# Patient Record
Sex: MALE | Race: WHITE | NOT HISPANIC OR LATINO | Employment: FULL TIME | URBAN - METROPOLITAN AREA
[De-identification: names, ages, dates, MRNs, and addresses within clinical notes are randomized per-mention and may not be internally consistent; named-entity substitution may affect disease eponyms.]

---

## 2022-03-03 ENCOUNTER — PATIENT MESSAGE (OUTPATIENT)
Dept: ENDOCRINOLOGY | Facility: CLINIC | Age: 27
End: 2022-03-03
Payer: COMMERCIAL

## 2022-03-17 ENCOUNTER — OFFICE VISIT (OUTPATIENT)
Dept: ENDOCRINOLOGY | Facility: CLINIC | Age: 27
End: 2022-03-17
Payer: COMMERCIAL

## 2022-03-17 ENCOUNTER — TELEPHONE (OUTPATIENT)
Dept: SPEECH THERAPY | Facility: HOSPITAL | Age: 27
End: 2022-03-17
Payer: COMMERCIAL

## 2022-03-17 PROCEDURE — 99204 OFFICE O/P NEW MOD 45 MIN: CPT | Mod: 95,,, | Performed by: INTERNAL MEDICINE

## 2022-03-17 PROCEDURE — 1159F MED LIST DOCD IN RCRD: CPT | Mod: CPTII,95,, | Performed by: INTERNAL MEDICINE

## 2022-03-17 PROCEDURE — 1160F PR REVIEW ALL MEDS BY PRESCRIBER/CLIN PHARMACIST DOCUMENTED: ICD-10-PCS | Mod: CPTII,95,, | Performed by: INTERNAL MEDICINE

## 2022-03-17 PROCEDURE — 1160F RVW MEDS BY RX/DR IN RCRD: CPT | Mod: CPTII,95,, | Performed by: INTERNAL MEDICINE

## 2022-03-17 PROCEDURE — 1159F PR MEDICATION LIST DOCUMENTED IN MEDICAL RECORD: ICD-10-PCS | Mod: CPTII,95,, | Performed by: INTERNAL MEDICINE

## 2022-03-17 PROCEDURE — 99204 PR OFFICE/OUTPT VISIT, NEW, LEVL IV, 45-59 MIN: ICD-10-PCS | Mod: 95,,, | Performed by: INTERNAL MEDICINE

## 2022-03-17 RX ORDER — FINASTERIDE 5 MG/1
5 TABLET, FILM COATED ORAL DAILY
Qty: 90 TABLET | Refills: 3 | Status: SHIPPED | OUTPATIENT
Start: 2022-03-17 | End: 2023-03-06

## 2022-03-17 RX ORDER — SPIRONOLACTONE 25 MG/1
25 TABLET ORAL 2 TIMES DAILY
Qty: 60 TABLET | Refills: 5 | Status: SHIPPED | OUTPATIENT
Start: 2022-03-17 | End: 2022-08-17

## 2022-03-17 RX ORDER — ESTRADIOL 1 MG/1
1 TABLET ORAL 2 TIMES DAILY
Qty: 60 TABLET | Refills: 5 | Status: SHIPPED | OUTPATIENT
Start: 2022-03-17 | End: 2022-05-10

## 2022-03-17 NOTE — ASSESSMENT & PLAN NOTE
Transwoman: gender incongruence   Reviewed therapy, side effects (both wanted and unwanted), possible adverse outcomes, expectations, compliance. Reviewed limited data on fertility, expect decline over time... consider sperm banking.     Patient encouraged to continue working with her therapist during the transition process.      Will start  Estradiol 1 mg po bid  Will start  aldactone 25 mg bid  Will start   finasteride 5 mg qd for scalp hair loss prevention     baseline labs   RTO in 3 months with labs .  Healthy lifestyle stressed  Discussed increased risk of dvt   Avoid prolonged immobility  D/c ert prior to any procedures

## 2022-03-17 NOTE — PROGRESS NOTES
Subjective:      Patient ID: Daniel Delgado is a 26 y.o. male.    Chief Complaint:  Gender     History of Present Illness  With regards to her gender incongruence care:    Gender identity - female  Pronouns she/her      Therapist at the time hormones were started:  Travon Oneil  (see medica tab)        Gender Surgeries - none, but interested grs and ffs      Fertility concerns - not interested    Has not started cross hormone therapy but would like to start.  Is also interested in voice training      Social:  Work -  Game development   Family -   Aware   generally supportive   Relationship- single   Interested in women     Housing - lives alone       was dx with social anxiety in the past       New concerns today:     none     There is a family history of thyroid disease so she would like that checked.  Denies any weight changes or bowel changes    ROS:   As above    Objective:     There were no vitals taken for this visit.  BP Readings from Last 3 Encounters:   No data found for BP     Wt Readings from Last 1 Encounters:   No data found for Wt     There is no height or weight on file to calculate BMI.      Physical Exam  Constitutional:       Appearance: Normal appearance.   Psychiatric:         Mood and Affect: Mood normal.         Behavior: Behavior normal.         Thought Content: Thought content normal.         Judgment: Judgment normal.                Lab Review: none     Assessment and Plan     trans woman (she/her)   Transwoman: gender incongruence   Reviewed therapy, side effects (both wanted and unwanted), possible adverse outcomes, expectations, compliance. Reviewed limited data on fertility, expect decline over time... consider sperm banking.     Patient encouraged to continue working with her therapist during the transition process.      Will start  Estradiol 1 mg po bid  Will start  aldactone 25 mg bid  Will start   finasteride 5 mg qd for scalp hair loss prevention     baseline labs   RTO in 3 months  with labs .  Healthy lifestyle stressed  Discussed increased risk of dvt   Avoid prolonged immobility  D/c ert prior to any procedures            The patient location is: home  The chief complaint leading to consultation is: gender    Visit type: audiovisual    Face to Face time with patient: 25 minutes of total time spent on the encounter, which includes face to face time and non-face to face time preparing to see the patient (eg, review of tests), Obtaining and/or reviewing separately obtained history, Documenting clinical information in the electronic or other health record, Independently interpreting results (not separately reported) and communicating results to the patient/family/caregiver, or Care coordination (not separately reported).         Each patient to whom he or she provides medical services by telemedicine is:  (1) informed of the relationship between the physician and patient and the respective role of any other health care provider with respect to management of the patient; and (2) notified that he or she may decline to receive medical services by telemedicine and may withdraw from such care at any time.

## 2022-03-17 NOTE — PATIENT INSTRUCTIONS
Thank you for completing a virtual visit with me!     Per our conversation:  We will start estrogen 1 mg twice a day, spironolactone 25 mg twice a day and finasteride 5 mg once a day.  My office will reach out to arrange for baseline labs soon.  I will put in a referral to the speech team and they will contact you to set up the visit.    We will plan a telemedicine or an in-clinic visit in 3 months with labs prior to that appointment.    My staff will contact you to schedule the above.     Please let me know if you have any other questions.    Thank you,  Elodia Molina MD

## 2022-03-24 ENCOUNTER — CLINICAL SUPPORT (OUTPATIENT)
Dept: SPEECH THERAPY | Facility: HOSPITAL | Age: 27
End: 2022-03-24
Attending: INTERNAL MEDICINE
Payer: COMMERCIAL

## 2022-03-24 DIAGNOSIS — R49.8 OTHER VOICE AND RESONANCE DISORDERS: Primary | ICD-10-CM

## 2022-03-24 PROCEDURE — 92524 BEHAVRAL QUALIT ANALYS VOICE: CPT | Mod: 95,GN | Performed by: SPEECH-LANGUAGE PATHOLOGIST

## 2022-03-24 NOTE — PROGRESS NOTES
Referring provider: Dr. Elodia Molina  Reason for visit:  Behavioral and qualitative analysis of voice and resonance (CPT 61088)    The patient location is: [place]  The chief complaint leading to consultation is: voice  Visit type: audiovisual  Face to Face time with patient: 40  55 minutes of total time spent on the encounter, which includes face to face time and non-face to face time preparing to see the patient (eg, review of tests), Obtaining and/or reviewing separately obtained history, Documenting clinical information in the electronic or other health record, Independently interpreting results (not separately reported) and communicating results to the patient/family/caregiver, or Care coordination (not separately reported).   Each patient to whom he or she provides medical services by telemedicine is:  (1) informed of the relationship between the physician and patient and the respective role of any other health care provider with respect to management of the patient; and (2) notified that he or she may decline to receive medical services by telemedicine and may withdraw from such care at any time.    Subjective / History    Janes Delgado is a 26 y.o. trans woman (assigned male at birth) referred for voice evaluation (CPT 06449) by Dr. Molina.  She presents with complaints of voice incongruence which began about 5 months ago when she initially identified she is a trans woman.  Denies vocal fatigue, effort, hoarseness.  Recently trying to feminize voice, does feel speaking pitch has increased a bit.  Is able to shift voice/resonance but hard time getting it to sound feminine.  Concern for being misgendered over the phone.  Patient works outside the home.  Presenting as male in most situations.  Mostly this is because she feels like she looks like a male presenting as a woman.  Only out to a select few people.  Works in game development.  Overall goal is to sound more feminine.     Gender: woman  Hormones:  just started a few days ago  Swallowing: no c/o   Breathing: no c/o    Smokin packs/day   Caffeine: minimal  Reflux: no  Water: plenty    No past medical history on file.  Current Outpatient Medications on File Prior to Visit   Medication Sig Dispense Refill    estradioL (ESTRACE) 1 MG tablet Take 1 tablet (1 mg total) by mouth 2 (two) times daily. 60 tablet 5    finasteride (PROSCAR) 5 mg tablet Take 1 tablet (5 mg total) by mouth once daily. 90 tablet 3    spironolactone (ALDACTONE) 25 MG tablet Take 1 tablet (25 mg total) by mouth 2 (two) times daily. 60 tablet 5     No current facility-administered medications on file prior to visit.       Objective    Perceptual/behavioral assessment  -CAPE-V Overall Score: 2  -Quality: clear, appropriate  -Volume: appropriate for age and gender  -Pitch: low F0  -Flexibility: diminished  -Habitual respiratory pattern: chest/clavicular    Education / Stimulability Trials  Deferred vocal hygiene discussion.  Patient was stimulable for improved voice using SOVT exercises.  Instructed pt on SOVT cup bubble phonation and she was quickly stimulable to an increased resonant placement/pitch which she reported did not feel effortful.  Instructed practicing voice for several days or weeks to determine if it feels authentic to her and if it feels comfortable and sustainable.  Discussed difficulty in changing/maintaining improved voice if still presenting as former gender in many situations, but that vocal exploration can help her find a voice she is comfortable with in the meantime.  She was amenable to all suggestions.     Functional goals  Length Status Goal   Long term Initiated  Patient and clinician will facilitate changes in vocal function in order to restore functional use of voice for daily occupational, social, and emotional demands.    Long term Initiated  Patient will re-establish phonation with adequate balance of airflow and resonance with decreased muscle tension.     Long term Initiated   Patient will be satisfied with quality of voice as it pertains to gender identity.    Short term Initiated  Patient will complete SOVT exercises and/or resonant-focused exercises 3-5x daily to strengthen and balance the intrinsic laryngeal musculature and maximize glottic closure without medial hyperfunction.   Short term Initiated  Patient will be correctly gendered over the phone with 80% accuracy.   Short term Initiated  Patient will discriminate between easy and strained phonation with 80% accuracy.    Short term Initiated   Patient will identify the sensations associated with muscle relaxation in the abdominal, thoracic, neck and facial areas during efficient phonation with minimal clinician cue.        Assessment     Patient presents with gender to voice mismatch in the setting of gender transition per Dr. Molina.  Prognosis for continued improvement is good.     Recommendations / POC    -Recommend 2-4 sessions of voice therapy over 4-6 weeks with a speech-language pathologist to optimize glottal postures for improved vocal function, vocal efficiency, and ease of phonation  -Continue exercises as discussed in session  -Contact clinician with any further questions

## 2022-04-07 ENCOUNTER — CLINICAL SUPPORT (OUTPATIENT)
Dept: SPEECH THERAPY | Facility: HOSPITAL | Age: 27
End: 2022-04-07
Attending: INTERNAL MEDICINE
Payer: COMMERCIAL

## 2022-04-07 DIAGNOSIS — R49.8 OTHER VOICE AND RESONANCE DISORDERS: ICD-10-CM

## 2022-04-07 PROCEDURE — 92507 TX SP LANG VOICE COMM INDIV: CPT | Mod: 95,GN | Performed by: SPEECH-LANGUAGE PATHOLOGIST

## 2022-04-07 NOTE — PROGRESS NOTES
"Referring provider: Dr. Elodia Molina  Reason for visit:  Voice treatment (CPT 28530)  Session #1    The patient location is: [place]  The chief complaint leading to consultation is: voice   Visit type: audiovisual  Face to Face time with patient: 35  45 minutes of total time spent on the encounter, which includes face to face time and non-face to face time preparing to see the patient (eg, review of tests), Obtaining and/or reviewing separately obtained history, Documenting clinical information in the electronic or other health record, Independently interpreting results (not separately reported) and communicating results to the patient/family/caregiver, or Care coordination (not separately reported).   Each patient to whom he or she provides medical services by telemedicine is:  (1) informed of the relationship between the physician and patient and the respective role of any other health care provider with respect to management of the patient; and (2) notified that he or she may decline to receive medical services by telemedicine and may withdraw from such care at any time.      History / Subjective   I had the pleasure of seeing Daniel Delgado for her first treatment session following complete voice evaluation on 3/24/22.  During that time, improvements were noted on SOVT voice exercises.  Since evaluation, she has been practicing regularly but is still not out in most aspects of life. At first she wasn't noticing a change but does think that after comparing it to baseline there has been a change.  Does the exercises a few times a day when she thinks about it.  She does still have to think about changing her voice but also needs to access a more "baseline" voice most times.      Objective   The primary goal of todays session was to review HEP.  This was targeted using SOVT treatment modalities.    Perceptual/behavioral assessment  -CAPE-V Overall Score: 2  -Quality: mild intermittent alejandre  -Volume: " appropriate for age and gender identity  -Pitch: low F0  -Flexibility: appropriate for age and gender identity  -Habitual respiratory pattern: chest/clavicular    Treatment  Reviewed cup bubble phonation to access improved pitch.  She is pleased with the increase but thinks she wants it to be a bit higher.  Also instructed on straw phonation into water and resonant humming.  She also felt this gave her access to a smoother, higher voice.  For home practice, clinician reviewed home exercises as practiced during the session with the patient.     Functional goals  Length Status Goal   Long term Ongoing Patient and clinician will facilitate changes in vocal function in order to restore functional use of voice for daily occupational, social, and emotional demands.    Long term Ongoing Patient will re-establish phonation with adequate balance of airflow and resonance with decreased muscle tension.    Long term Ongoing Patient will be satisfied with quality of voice as it pertains to gender identity.    Short term Ongoing Patient will complete SOVT exercises and/or resonant-focused exercises 3-5x daily to strengthen and balance the intrinsic laryngeal musculature and maximize glottic closure without medial hyperfunction.   Short term Ongoing Patient will be correctly gendered over the phone with 80% accuracy.   Short term Ongoing Patient will discriminate between easy and strained phonation with 80% accuracy.    Short term Ongoing Patient will identify the sensations associated with muscle relaxation in the abdominal, thoracic, neck and facial areas during efficient phonation with minimal clinician cue.        Assessment     Patient presents with gender to voice mismatch in the setting of gender transition per Dr. Molina.  Prognosis for continued improvement is good.     Recommendations / POC    -Recommend 2-4 sessions of voice therapy over 4-6 weeks with a speech-language pathologist to optimize glottal postures for  improved vocal function, vocal efficiency, and ease of phonation  -Continue exercises as discussed in session  -Contact clinician with any further questions

## 2022-04-10 ENCOUNTER — PATIENT MESSAGE (OUTPATIENT)
Dept: ENDOCRINOLOGY | Facility: CLINIC | Age: 27
End: 2022-04-10
Payer: COMMERCIAL

## 2022-04-28 ENCOUNTER — CLINICAL SUPPORT (OUTPATIENT)
Dept: SPEECH THERAPY | Facility: HOSPITAL | Age: 27
End: 2022-04-28
Payer: COMMERCIAL

## 2022-04-28 DIAGNOSIS — R49.8 OTHER VOICE AND RESONANCE DISORDERS: ICD-10-CM

## 2022-04-28 PROCEDURE — 92507 TX SP LANG VOICE COMM INDIV: CPT | Mod: 95,GN | Performed by: SPEECH-LANGUAGE PATHOLOGIST

## 2022-04-28 NOTE — PROGRESS NOTES
Referring provider: Dr. Elodia Molina  Reason for visit:  Voice treatment (CPT 24965)  Session #2    The patient location is: [place]  The chief complaint leading to consultation is: voice   Visit type: audiovisual  Face to Face time with patient: 32  40 minutes of total time spent on the encounter, which includes face to face time and non-face to face time preparing to see the patient (eg, review of tests), Obtaining and/or reviewing separately obtained history, Documenting clinical information in the electronic or other health record, Independently interpreting results (not separately reported) and communicating results to the patient/family/caregiver, or Care coordination (not separately reported).   Each patient to whom he or she provides medical services by telemedicine is:  (1) informed of the relationship between the physician and patient and the respective role of any other health care provider with respect to management of the patient; and (2) notified that he or she may decline to receive medical services by telemedicine and may withdraw from such care at any time.      History / Subjective   I had the pleasure of seeing Daniel Delgado for her first treatment session following complete voice evaluation on 3/24/22.  During that time, improvements were noted on SOVT voice exercises.  Since last session on 4/7/22, she has been practicing her voice and feels she has made some progress.  Feels like now she can more easily access a better voice - feels it sounds more natural.  Uses it only when she talks to herself.  Still not using it publicly.      Objective   The primary goal of todays session was to review HEP.  This was targeted using SOVT treatment modalities.    Perceptual/behavioral assessment  -CAPE-V Overall Score: 2  -Quality: mild intermittent alejandre  -Volume: appropriate for age and gender identity  -Pitch: low F0  -Flexibility: appropriate for age and gender identity  -Habitual respiratory  pattern: chest/clavicular    Treatment  Reviewed straw bubble phonation to access pitch.  She notes a break when gliding on bubbles that she does not notice when using /I/ sound.  discussed balancing breath support with VF closure to improve glide w/o break.  Encouraged her to practice voice she feels most comfortable with - she demonstrates practicing at a much higher pitch than baseline, which she reports is comfortable, but thinks she would ultimately like her voice to land somewhere slightly more androgenous.  For home practice, clinician reviewed home exercises as practiced during the session with the patient.     Functional goals  Length Status Goal   Long term Ongoing Patient and clinician will facilitate changes in vocal function in order to restore functional use of voice for daily occupational, social, and emotional demands.    Long term Ongoing Patient will re-establish phonation with adequate balance of airflow and resonance with decreased muscle tension.    Long term Ongoing Patient will be satisfied with quality of voice as it pertains to gender identity.    Short term Ongoing Patient will complete SOVT exercises and/or resonant-focused exercises 3-5x daily to strengthen and balance the intrinsic laryngeal musculature and maximize glottic closure without medial hyperfunction.   Short term Ongoing Patient will be correctly gendered over the phone with 80% accuracy.   Short term Ongoing Patient will discriminate between easy and strained phonation with 80% accuracy.    Short term Ongoing Patient will identify the sensations associated with muscle relaxation in the abdominal, thoracic, neck and facial areas during efficient phonation with minimal clinician cue.        Assessment     Patient presents with gender to voice mismatch in the setting of gender transition per Dr. Molina.  Prognosis for continued improvement is good.     Recommendations / POC    -Recommend 2-4 sessions of voice therapy over 4-6  weeks with a speech-language pathologist to optimize glottal postures for improved vocal function, vocal efficiency, and ease of phonation  -Continue exercises as discussed in session  -Contact clinician with any further questions

## 2022-06-13 ENCOUNTER — PATIENT MESSAGE (OUTPATIENT)
Dept: SPEECH THERAPY | Facility: HOSPITAL | Age: 27
End: 2022-06-13
Payer: COMMERCIAL

## 2022-08-01 ENCOUNTER — OFFICE VISIT (OUTPATIENT)
Dept: ENDOCRINOLOGY | Facility: CLINIC | Age: 27
End: 2022-08-01
Payer: COMMERCIAL

## 2022-08-01 PROCEDURE — 99213 OFFICE O/P EST LOW 20 MIN: CPT | Mod: 95,,, | Performed by: INTERNAL MEDICINE

## 2022-08-01 PROCEDURE — 1159F PR MEDICATION LIST DOCUMENTED IN MEDICAL RECORD: ICD-10-PCS | Mod: CPTII,95,, | Performed by: INTERNAL MEDICINE

## 2022-08-01 PROCEDURE — 1160F RVW MEDS BY RX/DR IN RCRD: CPT | Mod: CPTII,95,, | Performed by: INTERNAL MEDICINE

## 2022-08-01 PROCEDURE — 1160F PR REVIEW ALL MEDS BY PRESCRIBER/CLIN PHARMACIST DOCUMENTED: ICD-10-PCS | Mod: CPTII,95,, | Performed by: INTERNAL MEDICINE

## 2022-08-01 PROCEDURE — 99213 PR OFFICE/OUTPT VISIT, EST, LEVL III, 20-29 MIN: ICD-10-PCS | Mod: 95,,, | Performed by: INTERNAL MEDICINE

## 2022-08-01 PROCEDURE — 1159F MED LIST DOCD IN RCRD: CPT | Mod: CPTII,95,, | Performed by: INTERNAL MEDICINE

## 2022-08-01 NOTE — PATIENT INSTRUCTIONS
Thank you for completing a virtual visit with me!     Per our conversation:  My nurse will reach out to arrange for labs to be done soon.  I am expecting that we will be able to double the doses of estrogen and spironolactone at that time.  I will send you a note in patient portal once I see the results    We will plan a telemedicine or an in-clinic visit in 3  months with labs prior to that appointment.    My staff will contact you to schedule the above.     Please let me know if you have any other questions.    Thank you,  Elodia Molina MD

## 2022-08-01 NOTE — PROGRESS NOTES
Subjective:      Patient ID: Daniel Delgado is a 27 y.o. male.    Chief Complaint:  Gender     History of Present Illness  With regards to her gender incongruence care:    Gender identity - female  Pronouns she/her      Therapist at the time hormones were started:  Travon Oneil  (see medica tab)        Gender Surgeries - none, but interested grs and ffs      Fertility concerns - not interested      started cross hormone therapy 3/2022  Estradiol 1 mg po bid  aldactone 25 mg bid  finasteride 5 mg qd for scalp hair loss prevention   Seeing positive changes   No regret   Is excited she is doing this   Did  voice training      Social:  Work -  Game development   Family -   Aware   generally supportive   Relationship- single   Interested in women     Housing - lives alone       was dx with social anxiety in the past       New concerns today:     none     There is a family history of thyroid disease so she would like that checked.  Denies any weight changes or bowel changes    ROS:   As above    Objective:     There were no vitals taken for this visit.  BP Readings from Last 3 Encounters:   No data found for BP     Wt Readings from Last 1 Encounters:   No data found for Wt     There is no height or weight on file to calculate BMI.      Physical Exam  Constitutional:       Appearance: Normal appearance.   Psychiatric:         Mood and Affect: Mood normal.         Behavior: Behavior normal.         Thought Content: Thought content normal.         Judgment: Judgment normal.                Lab Review: none     Assessment and Plan     trans woman (she/her)   Transwoman: gender incongruence   Reviewed therapy, side effects (both wanted and unwanted), possible adverse outcomes, expectations, compliance. Reviewed limited data on fertility, expect decline over time... consider sperm banking.     Patient encouraged to continue working with her therapist during the transition process.      Labs ASAP.  Expect will be able to double the  doses of estrogen and spironolactone at that     RTO in 3 months with labs .  Healthy lifestyle stressed  Discussed increased risk of dvt   Avoid prolonged immobility  D/c ert prior to any procedures            The patient location is: home  The chief complaint leading to consultation is: gender    Visit type: audiovisual    Face to Face time with patient: 25 minutes of total time spent on the encounter, which includes face to face time and non-face to face time preparing to see the patient (eg, review of tests), Obtaining and/or reviewing separately obtained history, Documenting clinical information in the electronic or other health record, Independently interpreting results (not separately reported) and communicating results to the patient/family/caregiver, or Care coordination (not separately reported).         Each patient to whom he or she provides medical services by telemedicine is:  (1) informed of the relationship between the physician and patient and the respective role of any other health care provider with respect to management of the patient; and (2) notified that he or she may decline to receive medical services by telemedicine and may withdraw from such care at any time.

## 2022-08-01 NOTE — ASSESSMENT & PLAN NOTE
Transwoman: gender incongruence   Reviewed therapy, side effects (both wanted and unwanted), possible adverse outcomes, expectations, compliance. Reviewed limited data on fertility, expect decline over time... consider sperm banking.     Patient encouraged to continue working with her therapist during the transition process.      Labs ASAP.  Expect will be able to double the doses of estrogen and spironolactone at that     RTO in 3 months with labs .  Healthy lifestyle stressed  Discussed increased risk of dvt   Avoid prolonged immobility  D/c ert prior to any procedures

## 2022-08-02 ENCOUNTER — PATIENT MESSAGE (OUTPATIENT)
Dept: ENDOCRINOLOGY | Facility: CLINIC | Age: 27
End: 2022-08-02
Payer: COMMERCIAL

## 2022-09-02 ENCOUNTER — LAB VISIT (OUTPATIENT)
Dept: LAB | Facility: HOSPITAL | Age: 27
End: 2022-09-02
Attending: INTERNAL MEDICINE
Payer: COMMERCIAL

## 2022-09-02 LAB
ALBUMIN SERPL BCP-MCNC: 4.8 G/DL (ref 3.5–5.2)
ALP SERPL-CCNC: 70 U/L (ref 55–135)
ALT SERPL W/O P-5'-P-CCNC: 21 U/L (ref 10–44)
ANION GAP SERPL CALC-SCNC: 11 MMOL/L (ref 8–16)
AST SERPL-CCNC: 25 U/L (ref 10–40)
BILIRUB SERPL-MCNC: 0.9 MG/DL (ref 0.1–1)
BUN SERPL-MCNC: 9 MG/DL (ref 6–20)
CALCIUM SERPL-MCNC: 10.2 MG/DL (ref 8.7–10.5)
CHLORIDE SERPL-SCNC: 102 MMOL/L (ref 95–110)
CHOLEST SERPL-MCNC: 174 MG/DL (ref 120–199)
CHOLEST/HDLC SERPL: 3.6 {RATIO} (ref 2–5)
CO2 SERPL-SCNC: 26 MMOL/L (ref 23–29)
CREAT SERPL-MCNC: 1 MG/DL (ref 0.5–1.4)
EST. GFR  (NO RACE VARIABLE): >60 ML/MIN/1.73 M^2
ESTRADIOL SERPL-MCNC: 53 PG/ML (ref 11–44)
GLUCOSE SERPL-MCNC: 87 MG/DL (ref 70–110)
HDLC SERPL-MCNC: 49 MG/DL (ref 40–75)
HDLC SERPL: 28.2 % (ref 20–50)
LDLC SERPL CALC-MCNC: 106.6 MG/DL (ref 63–159)
NONHDLC SERPL-MCNC: 125 MG/DL
POTASSIUM SERPL-SCNC: 4.3 MMOL/L (ref 3.5–5.1)
PROT SERPL-MCNC: 7.8 G/DL (ref 6–8.4)
SODIUM SERPL-SCNC: 139 MMOL/L (ref 136–145)
TESTOST SERPL-MCNC: 211 NG/DL (ref 304–1227)
TRIGL SERPL-MCNC: 92 MG/DL (ref 30–150)
TSH SERPL DL<=0.005 MIU/L-ACNC: 3.94 UIU/ML (ref 0.4–4)

## 2022-09-02 PROCEDURE — 80053 COMPREHEN METABOLIC PANEL: CPT | Performed by: INTERNAL MEDICINE

## 2022-09-02 PROCEDURE — 84403 ASSAY OF TOTAL TESTOSTERONE: CPT | Performed by: INTERNAL MEDICINE

## 2022-09-02 PROCEDURE — 36415 COLL VENOUS BLD VENIPUNCTURE: CPT | Performed by: INTERNAL MEDICINE

## 2022-09-02 PROCEDURE — 82670 ASSAY OF TOTAL ESTRADIOL: CPT | Performed by: INTERNAL MEDICINE

## 2022-09-02 PROCEDURE — 84443 ASSAY THYROID STIM HORMONE: CPT | Performed by: INTERNAL MEDICINE

## 2022-09-02 PROCEDURE — 80061 LIPID PANEL: CPT | Performed by: INTERNAL MEDICINE

## 2022-09-06 ENCOUNTER — PATIENT MESSAGE (OUTPATIENT)
Dept: ENDOCRINOLOGY | Facility: CLINIC | Age: 27
End: 2022-09-06
Payer: COMMERCIAL

## 2022-09-06 RX ORDER — SPIRONOLACTONE 50 MG/1
50 TABLET, FILM COATED ORAL 2 TIMES DAILY
Qty: 180 TABLET | Refills: 3 | Status: SHIPPED | OUTPATIENT
Start: 2022-09-06 | End: 2023-09-01

## 2022-09-06 RX ORDER — ESTRADIOL 2 MG/1
2 TABLET ORAL 2 TIMES DAILY
Qty: 180 TABLET | Refills: 3 | Status: SHIPPED | OUTPATIENT
Start: 2022-09-06 | End: 2022-12-21 | Stop reason: SDUPTHER

## 2022-12-07 ENCOUNTER — OFFICE VISIT (OUTPATIENT)
Dept: ENDOCRINOLOGY | Facility: CLINIC | Age: 27
End: 2022-12-07
Payer: COMMERCIAL

## 2022-12-07 DIAGNOSIS — Z79.899 TRANSGENDER WOMAN ON HORMONE THERAPY: ICD-10-CM

## 2022-12-07 DIAGNOSIS — F64.0 TRANSGENDER WOMAN ON HORMONE THERAPY: ICD-10-CM

## 2022-12-07 PROCEDURE — 1160F PR REVIEW ALL MEDS BY PRESCRIBER/CLIN PHARMACIST DOCUMENTED: ICD-10-PCS | Mod: CPTII,95,, | Performed by: INTERNAL MEDICINE

## 2022-12-07 PROCEDURE — 1159F MED LIST DOCD IN RCRD: CPT | Mod: CPTII,95,, | Performed by: INTERNAL MEDICINE

## 2022-12-07 PROCEDURE — 1160F RVW MEDS BY RX/DR IN RCRD: CPT | Mod: CPTII,95,, | Performed by: INTERNAL MEDICINE

## 2022-12-07 PROCEDURE — 99213 OFFICE O/P EST LOW 20 MIN: CPT | Mod: 95,,, | Performed by: INTERNAL MEDICINE

## 2022-12-07 PROCEDURE — 99213 PR OFFICE/OUTPT VISIT, EST, LEVL III, 20-29 MIN: ICD-10-PCS | Mod: 95,,, | Performed by: INTERNAL MEDICINE

## 2022-12-07 PROCEDURE — 1159F PR MEDICATION LIST DOCUMENTED IN MEDICAL RECORD: ICD-10-PCS | Mod: CPTII,95,, | Performed by: INTERNAL MEDICINE

## 2022-12-07 NOTE — PATIENT INSTRUCTIONS
Thank you for completing a virtual visit with me!     Per our conversation:  My nurse will reach out to arrange for labs to be done soon.  I am expecting that we will be able to increase the doses of estrogen and spironolactone at that time.  I will send you a note in patient portal once I see the results      Also, reach out to your pharmacy and see if they have injectable estrogen in stock.  It is called estradiol valerate.  It comes in it 10, 20, 40 milligram/mL vial.  Any will work....  Just let me know    We will plan a telemedicine or an in-clinic visit in 3  months with labs prior to that appointment.    Please let me know if you have any other questions.    Thank you,  Elodia Molina MD

## 2022-12-07 NOTE — PROGRESS NOTES
Subjective:      Patient ID: Daniel Delgado is a 27 y.o. male.    Chief Complaint:  Gender     History of Present Illness  With regards to her gender incongruence care:    Gender identity - female  Pronouns she/her      Therapist at the time hormones were started:  Travon Oneil  (see media tab)        Gender Surgeries - none, but interested grs and ffs      Fertility concerns - not interested      started cross hormone therapy 3/2022  Estradiol 2  mg po bid  --sl  - interested in injections   aldactone 50  mg bid  finasteride 5 mg qd for scalp hair loss prevention   Seeing positive changes   +breast development   No regret   Is excited she is doing this   Did  voice training      Social:  Work -  Game development   Family -   Aware   generally supportive   Relationship- single   Interested in women     Housing - lives alone       was dx with social anxiety in the past       New concerns today:     none     There is a family history of thyroid disease so she would like that checked.  Denies any weight changes or bowel changes    ROS:   As above    Objective:     There were no vitals taken for this visit.  BP Readings from Last 3 Encounters:   No data found for BP     Wt Readings from Last 1 Encounters:   No data found for Wt     There is no height or weight on file to calculate BMI.      Physical Exam  Constitutional:       Appearance: Normal appearance.   Psychiatric:         Mood and Affect: Mood normal.         Behavior: Behavior normal.         Thought Content: Thought content normal.         Judgment: Judgment normal.              Lab Reviewed    Assessment and Plan     Transgender woman on hormone therapy  Transwoman: gender incongruence   Reviewed therapy, side effects (both wanted and unwanted), possible adverse outcomes, expectations, compliance. Reviewed limited data on fertility, expect decline over time...      Labs soon.  Expect will be able to increase the doses of estrogen and spironolactone at that  time    She will look into the availability of estradiol valerate and let me know if affordable and available.  If so we can switch     RTO in 3 months with labs .  Healthy lifestyle stressed  Discussed increased risk of dvt   Avoid prolonged immobility  D/c ert prior to any procedures        Check TPO    The patient location is: home  The chief complaint leading to consultation is: gender    Visit type: audiovisual    Face to Face time with patient: 25 minutes of total time spent on the encounter, which includes face to face time and non-face to face time preparing to see the patient (eg, review of tests), Obtaining and/or reviewing separately obtained history, Documenting clinical information in the electronic or other health record, Independently interpreting results (not separately reported) and communicating results to the patient/family/caregiver, or Care coordination (not separately reported).         Each patient to whom he or she provides medical services by telemedicine is:  (1) informed of the relationship between the physician and patient and the respective role of any other health care provider with respect to management of the patient; and (2) notified that he or she may decline to receive medical services by telemedicine and may withdraw from such care at any time.

## 2022-12-07 NOTE — ASSESSMENT & PLAN NOTE
Transwoman: gender incongruence   Reviewed therapy, side effects (both wanted and unwanted), possible adverse outcomes, expectations, compliance. Reviewed limited data on fertility, expect decline over time...      Labs soon.  Expect will be able to increase the doses of estrogen and spironolactone at that time    She will look into the availability of estradiol valerate and let me know if affordable and available.  If so we can switch     RTO in 3 months with labs .  Healthy lifestyle stressed  Discussed increased risk of dvt   Avoid prolonged immobility  D/c ert prior to any procedures

## 2022-12-14 ENCOUNTER — LAB VISIT (OUTPATIENT)
Dept: LAB | Facility: HOSPITAL | Age: 27
End: 2022-12-14
Attending: INTERNAL MEDICINE
Payer: COMMERCIAL

## 2022-12-14 DIAGNOSIS — F64.0 TRANSGENDER WOMAN ON HORMONE THERAPY: ICD-10-CM

## 2022-12-14 DIAGNOSIS — Z79.899 TRANSGENDER WOMAN ON HORMONE THERAPY: ICD-10-CM

## 2022-12-14 LAB
ALBUMIN SERPL BCP-MCNC: 4.3 G/DL (ref 3.5–5.2)
ALP SERPL-CCNC: 66 U/L (ref 55–135)
ALT SERPL W/O P-5'-P-CCNC: 16 U/L (ref 10–44)
ANION GAP SERPL CALC-SCNC: 9 MMOL/L (ref 8–16)
AST SERPL-CCNC: 18 U/L (ref 10–40)
BILIRUB SERPL-MCNC: 0.5 MG/DL (ref 0.1–1)
BUN SERPL-MCNC: 8 MG/DL (ref 6–20)
CALCIUM SERPL-MCNC: 9.8 MG/DL (ref 8.7–10.5)
CHLORIDE SERPL-SCNC: 101 MMOL/L (ref 95–110)
CO2 SERPL-SCNC: 27 MMOL/L (ref 23–29)
CREAT SERPL-MCNC: 0.9 MG/DL (ref 0.5–1.4)
EST. GFR  (NO RACE VARIABLE): >60 ML/MIN/1.73 M^2
ESTRADIOL SERPL-MCNC: 53 PG/ML (ref 11–44)
GLUCOSE SERPL-MCNC: 92 MG/DL (ref 70–110)
POTASSIUM SERPL-SCNC: 3.7 MMOL/L (ref 3.5–5.1)
PROT SERPL-MCNC: 7.5 G/DL (ref 6–8.4)
SODIUM SERPL-SCNC: 137 MMOL/L (ref 136–145)
TESTOST SERPL-MCNC: 51 NG/DL (ref 304–1227)
THYROPEROXIDASE IGG SERPL-ACNC: <6 IU/ML
TSH SERPL DL<=0.005 MIU/L-ACNC: 2.08 UIU/ML (ref 0.4–4)

## 2022-12-14 PROCEDURE — 86376 MICROSOMAL ANTIBODY EACH: CPT | Performed by: INTERNAL MEDICINE

## 2022-12-14 PROCEDURE — 82670 ASSAY OF TOTAL ESTRADIOL: CPT | Performed by: INTERNAL MEDICINE

## 2022-12-14 PROCEDURE — 80053 COMPREHEN METABOLIC PANEL: CPT | Performed by: INTERNAL MEDICINE

## 2022-12-14 PROCEDURE — 36415 COLL VENOUS BLD VENIPUNCTURE: CPT | Performed by: INTERNAL MEDICINE

## 2022-12-14 PROCEDURE — 84403 ASSAY OF TOTAL TESTOSTERONE: CPT | Performed by: INTERNAL MEDICINE

## 2022-12-14 PROCEDURE — 84443 ASSAY THYROID STIM HORMONE: CPT | Performed by: INTERNAL MEDICINE

## 2022-12-15 ENCOUNTER — PATIENT MESSAGE (OUTPATIENT)
Dept: ENDOCRINOLOGY | Facility: CLINIC | Age: 27
End: 2022-12-15
Payer: COMMERCIAL

## 2022-12-15 DIAGNOSIS — F64.0 TRANSGENDER WOMAN ON HORMONE THERAPY: Primary | ICD-10-CM

## 2022-12-15 DIAGNOSIS — Z79.899 TRANSGENDER WOMAN ON HORMONE THERAPY: Primary | ICD-10-CM

## 2022-12-21 ENCOUNTER — PATIENT MESSAGE (OUTPATIENT)
Dept: ENDOCRINOLOGY | Facility: CLINIC | Age: 27
End: 2022-12-21
Payer: COMMERCIAL

## 2022-12-21 RX ORDER — ESTRADIOL 2 MG/1
3 TABLET ORAL 2 TIMES DAILY
Qty: 270 TABLET | Refills: 3 | Status: SHIPPED | OUTPATIENT
Start: 2022-12-21 | End: 2023-03-09 | Stop reason: SDUPTHER

## 2023-01-03 ENCOUNTER — TELEPHONE (OUTPATIENT)
Dept: ENDOCRINOLOGY | Facility: CLINIC | Age: 28
End: 2023-01-03
Payer: COMMERCIAL

## 2023-01-03 ENCOUNTER — PATIENT MESSAGE (OUTPATIENT)
Dept: ENDOCRINOLOGY | Facility: CLINIC | Age: 28
End: 2023-01-03
Payer: COMMERCIAL

## 2023-01-03 NOTE — TELEPHONE ENCOUNTER
Called the pharmacy to find out whether rx was received.    Pharmacy staff stated that they filled it on 12/21/22. Patient has to use up the medication that they picked up previously due to insurance and follow-through with new directions provided on the new script.

## 2023-01-04 ENCOUNTER — TELEPHONE (OUTPATIENT)
Dept: ENDOCRINOLOGY | Facility: CLINIC | Age: 28
End: 2023-01-04
Payer: COMMERCIAL

## 2023-02-23 ENCOUNTER — PATIENT MESSAGE (OUTPATIENT)
Dept: ENDOCRINOLOGY | Facility: CLINIC | Age: 28
End: 2023-02-23
Payer: COMMERCIAL

## 2023-03-06 ENCOUNTER — LAB VISIT (OUTPATIENT)
Dept: LAB | Facility: HOSPITAL | Age: 28
End: 2023-03-06
Attending: INTERNAL MEDICINE
Payer: COMMERCIAL

## 2023-03-06 DIAGNOSIS — F64.0 TRANSGENDER WOMAN ON HORMONE THERAPY: ICD-10-CM

## 2023-03-06 DIAGNOSIS — Z79.899 TRANSGENDER WOMAN ON HORMONE THERAPY: ICD-10-CM

## 2023-03-06 PROCEDURE — 84403 ASSAY OF TOTAL TESTOSTERONE: CPT | Performed by: INTERNAL MEDICINE

## 2023-03-06 PROCEDURE — 36415 COLL VENOUS BLD VENIPUNCTURE: CPT | Performed by: INTERNAL MEDICINE

## 2023-03-06 PROCEDURE — 82670 ASSAY OF TOTAL ESTRADIOL: CPT | Performed by: INTERNAL MEDICINE

## 2023-03-07 LAB
ESTRADIOL SERPL-MCNC: 86 PG/ML (ref 11–44)
TESTOST SERPL-MCNC: 25 NG/DL (ref 304–1227)

## 2023-03-09 ENCOUNTER — TELEPHONE (OUTPATIENT)
Dept: ENDOCRINOLOGY | Facility: CLINIC | Age: 28
End: 2023-03-09
Payer: COMMERCIAL

## 2023-03-09 ENCOUNTER — OFFICE VISIT (OUTPATIENT)
Dept: ENDOCRINOLOGY | Facility: CLINIC | Age: 28
End: 2023-03-09
Attending: INTERNAL MEDICINE
Payer: COMMERCIAL

## 2023-03-09 DIAGNOSIS — F64.0 TRANSGENDER WOMAN ON HORMONE THERAPY: ICD-10-CM

## 2023-03-09 DIAGNOSIS — Z79.899 TRANSGENDER WOMAN ON HORMONE THERAPY: ICD-10-CM

## 2023-03-09 PROCEDURE — 99213 PR OFFICE/OUTPT VISIT, EST, LEVL III, 20-29 MIN: ICD-10-PCS | Mod: 95,,, | Performed by: INTERNAL MEDICINE

## 2023-03-09 PROCEDURE — 1160F RVW MEDS BY RX/DR IN RCRD: CPT | Mod: CPTII,95,, | Performed by: INTERNAL MEDICINE

## 2023-03-09 PROCEDURE — 1160F PR REVIEW ALL MEDS BY PRESCRIBER/CLIN PHARMACIST DOCUMENTED: ICD-10-PCS | Mod: CPTII,95,, | Performed by: INTERNAL MEDICINE

## 2023-03-09 PROCEDURE — 99213 OFFICE O/P EST LOW 20 MIN: CPT | Mod: 95,,, | Performed by: INTERNAL MEDICINE

## 2023-03-09 PROCEDURE — 1159F PR MEDICATION LIST DOCUMENTED IN MEDICAL RECORD: ICD-10-PCS | Mod: CPTII,95,, | Performed by: INTERNAL MEDICINE

## 2023-03-09 PROCEDURE — 1159F MED LIST DOCD IN RCRD: CPT | Mod: CPTII,95,, | Performed by: INTERNAL MEDICINE

## 2023-03-09 RX ORDER — ESTRADIOL 2 MG/1
4 TABLET ORAL 2 TIMES DAILY
Qty: 360 TABLET | Refills: 3 | Status: SHIPPED | OUTPATIENT
Start: 2023-03-09 | End: 2023-11-30

## 2023-03-09 NOTE — ASSESSMENT & PLAN NOTE
Transwoman: gender incongruence   Reviewed therapy, side effects (both wanted and unwanted), possible adverse outcomes, expectations, compliance.      Will increase Estradiol 4 mg po bid  Will continue aldactone 50 mg bid  Will continue finasteride 5 mg qd for scalp hair loss prevention     She will read about progesterone prior to next visit     Visit in 3 months with labs .  Healthy lifestyle stressed  Discussed increased risk of dvt   Avoid prolonged immobility  D/c ert prior to any procedures

## 2023-03-09 NOTE — PATIENT INSTRUCTIONS
Thank you for completing a virtual visit with me!     Per our conversation:  We will increase your estrogen to 4 mg twice a day.  Please continue the same dose of spironolactone.  Please look into progesterone and we will discuss starting it at next visit.      We will plan a telemedicine or an in-clinic visit in 3  months with labs prior to that appointment.    Please let me know if you have any other questions.    Thank you,  Elodia Molina MD

## 2023-03-09 NOTE — PROGRESS NOTES
Subjective:      Patient ID: Daniel Delgado is a 27 y.o. male.    Chief Complaint:  Gender     History of Present Illness  With regards to her gender incongruence care:    Gender identity - female  Pronouns she/her     She does not want to change her preferred name in our system because she often presents as male when she gets labs done       Therapist at the time hormones were started:  Travon Oneil  (see media tab)        Gender Surgeries - none, but interested grs and ffs      Fertility concerns - not interested      started cross hormone therapy 3/2022  Estradiol 3  mg po bid  --sl  - was interested in injections but decided to hold off   aldactone 50  mg bid  finasteride 5 mg qd for scalp hair loss prevention     Seeing positive changes     +breast development   No regret   Is excited she is doing this   Did  voice training      Social:  Work -  Game development   Family -   Aware   generally supportive   Relationship- single   Interested in women     Housing - lives alone       was dx with social anxiety in the past       New concerns today:     none        ROS:   As above    Objective:     There were no vitals taken for this visit.  BP Readings from Last 3 Encounters:   No data found for BP     Wt Readings from Last 1 Encounters:   No data found for Wt     There is no height or weight on file to calculate BMI.      Physical Exam  Constitutional:       Appearance: Normal appearance.   Psychiatric:         Mood and Affect: Mood normal.         Behavior: Behavior normal.         Thought Content: Thought content normal.         Judgment: Judgment normal.              Lab Reviewed   Latest Reference Range & Units 03/06/23 12:01   Estradiol 11 - 44 pg/mL 86 (H)   Testosterone, Total 304 - 1227 ng/dL 25 (L)   (H): Data is abnormally high  (L): Data is abnormally low  Assessment and Plan     Transgender woman on hormone therapy  Transwoman: gender incongruence   Reviewed therapy, side effects (both wanted and  unwanted), possible adverse outcomes, expectations, compliance.      Will increase Estradiol 4 mg po bid  Will continue aldactone 50 mg bid  Will continue finasteride 5 mg qd for scalp hair loss prevention     She will read about progesterone prior to next visit     Visit in 3 months with labs .  Healthy lifestyle stressed  Discussed increased risk of dvt   Avoid prolonged immobility  D/c ert prior to any procedures             The patient location is: home  The chief complaint leading to consultation is: gender    Visit type: audiovisual    Face to Face time with patient: 25 minutes of total time spent on the encounter, which includes face to face time and non-face to face time preparing to see the patient (eg, review of tests), Obtaining and/or reviewing separately obtained history, Documenting clinical information in the electronic or other health record, Independently interpreting results (not separately reported) and communicating results to the patient/family/caregiver, or Care coordination (not separately reported).         Each patient to whom he or she provides medical services by telemedicine is:  (1) informed of the relationship between the physician and patient and the respective role of any other health care provider with respect to management of the patient; and (2) notified that he or she may decline to receive medical services by telemedicine and may withdraw from such care at any time.

## 2023-06-08 ENCOUNTER — LAB VISIT (OUTPATIENT)
Dept: LAB | Facility: HOSPITAL | Age: 28
End: 2023-06-08
Attending: INTERNAL MEDICINE
Payer: COMMERCIAL

## 2023-06-08 DIAGNOSIS — Z79.899 TRANSGENDER WOMAN ON HORMONE THERAPY: ICD-10-CM

## 2023-06-08 DIAGNOSIS — F64.0 TRANSGENDER WOMAN ON HORMONE THERAPY: ICD-10-CM

## 2023-06-08 LAB
ALBUMIN SERPL BCP-MCNC: 4.2 G/DL (ref 3.5–5.2)
ALP SERPL-CCNC: 51 U/L (ref 55–135)
ALT SERPL W/O P-5'-P-CCNC: 12 U/L (ref 10–44)
ANION GAP SERPL CALC-SCNC: 9 MMOL/L (ref 8–16)
AST SERPL-CCNC: 16 U/L (ref 10–40)
BILIRUB SERPL-MCNC: 0.5 MG/DL (ref 0.1–1)
BUN SERPL-MCNC: 9 MG/DL (ref 6–20)
CALCIUM SERPL-MCNC: 9.1 MG/DL (ref 8.7–10.5)
CHLORIDE SERPL-SCNC: 102 MMOL/L (ref 95–110)
CO2 SERPL-SCNC: 26 MMOL/L (ref 23–29)
CREAT SERPL-MCNC: 1.1 MG/DL (ref 0.5–1.4)
EST. GFR  (NO RACE VARIABLE): >60 ML/MIN/1.73 M^2
ESTRADIOL SERPL-MCNC: 103 PG/ML (ref 11–44)
GLUCOSE SERPL-MCNC: 80 MG/DL (ref 70–110)
POTASSIUM SERPL-SCNC: 3.7 MMOL/L (ref 3.5–5.1)
PROT SERPL-MCNC: 7.1 G/DL (ref 6–8.4)
SODIUM SERPL-SCNC: 137 MMOL/L (ref 136–145)
TESTOST SERPL-MCNC: 28 NG/DL (ref 304–1227)

## 2023-06-08 PROCEDURE — 84403 ASSAY OF TOTAL TESTOSTERONE: CPT | Performed by: INTERNAL MEDICINE

## 2023-06-08 PROCEDURE — 82670 ASSAY OF TOTAL ESTRADIOL: CPT | Performed by: INTERNAL MEDICINE

## 2023-06-08 PROCEDURE — 80053 COMPREHEN METABOLIC PANEL: CPT | Performed by: INTERNAL MEDICINE

## 2023-06-08 PROCEDURE — 36415 COLL VENOUS BLD VENIPUNCTURE: CPT | Performed by: INTERNAL MEDICINE

## 2023-06-15 ENCOUNTER — OFFICE VISIT (OUTPATIENT)
Dept: ENDOCRINOLOGY | Facility: CLINIC | Age: 28
End: 2023-06-15
Attending: INTERNAL MEDICINE
Payer: COMMERCIAL

## 2023-06-15 DIAGNOSIS — Z79.899 TRANSGENDER WOMAN ON HORMONE THERAPY: ICD-10-CM

## 2023-06-15 DIAGNOSIS — F64.0 TRANSGENDER WOMAN ON HORMONE THERAPY: ICD-10-CM

## 2023-06-15 PROCEDURE — 99213 PR OFFICE/OUTPT VISIT, EST, LEVL III, 20-29 MIN: ICD-10-PCS | Mod: 95,,, | Performed by: INTERNAL MEDICINE

## 2023-06-15 PROCEDURE — 1159F MED LIST DOCD IN RCRD: CPT | Mod: CPTII,95,, | Performed by: INTERNAL MEDICINE

## 2023-06-15 PROCEDURE — 1160F PR REVIEW ALL MEDS BY PRESCRIBER/CLIN PHARMACIST DOCUMENTED: ICD-10-PCS | Mod: CPTII,95,, | Performed by: INTERNAL MEDICINE

## 2023-06-15 PROCEDURE — 1159F PR MEDICATION LIST DOCUMENTED IN MEDICAL RECORD: ICD-10-PCS | Mod: CPTII,95,, | Performed by: INTERNAL MEDICINE

## 2023-06-15 PROCEDURE — 1160F RVW MEDS BY RX/DR IN RCRD: CPT | Mod: CPTII,95,, | Performed by: INTERNAL MEDICINE

## 2023-06-15 PROCEDURE — 99213 OFFICE O/P EST LOW 20 MIN: CPT | Mod: 95,,, | Performed by: INTERNAL MEDICINE

## 2023-06-15 NOTE — PROGRESS NOTES
Subjective:      Patient ID: Daniel Delgado is a 28 y.o. adult.    Chief Complaint:  Gender     History of Present Illness  With regards to her gender incongruence care:    Gender identity - female  Pronouns she/her     She does not want to change her preferred name in our system because she often presents as male when she gets labs done       Therapist at the time hormones were started:  Travon Oneil  (see media tab)        Gender Surgeries - none, but interested grs and ffs      Fertility concerns - not interested      started cross hormone therapy 3/2022  Estradiol 4  mg po bid  --sl  - was interested in injections but decided to hold off   aldactone 50  mg bid  finasteride 5 mg qd for scalp hair loss prevention   Wants to wait on adding progesterone  Seeing positive changes     +breast development   No regret   Is excited she is doing this   Did  voice training      Social:  Work -  Game development   Family -   Aware   generally supportive   Relationship- single   Interested in women     Housing - lives alone       was dx with social anxiety in the past       New concerns today:     none        ROS:   As above    Objective:     There were no vitals taken for this visit.  BP Readings from Last 3 Encounters:   No data found for BP     Wt Readings from Last 1 Encounters:   No data found for Wt     There is no height or weight on file to calculate BMI.      Physical Exam  Constitutional:       Appearance: Normal appearance.   Psychiatric:         Mood and Affect: Mood normal.         Behavior: Behavior normal.         Thought Content: Thought content normal.         Judgment: Judgment normal.              Lab Reviewed   Latest Reference Range & Units 06/08/23 09:30   Estradiol 11 - 44 pg/mL 103 (H)   Testosterone, Total 304 - 1227 ng/dL 28 (L)   (H): Data is abnormally high  (L): Data is abnormally low  Assessment and Plan     Transgender woman on hormone therapy  Transwoman: gender incongruence   Reviewed therapy,  side effects (both wanted and unwanted), possible adverse outcomes, expectations, compliance.      Continue current regimen.  She will send me a note in patient portal if she wants to switch over to injectable estrogen or start progesterone       Visit in 12  months with labs .  Healthy lifestyle stressed  Discussed increased risk of dvt   Avoid prolonged immobility  D/c ert prior to any procedures             The patient location is: home  The chief complaint leading to consultation is: gender    Visit type: audiovisual    Face to Face time with patient: 25 minutes of total time spent on the encounter, which includes face to face time and non-face to face time preparing to see the patient (eg, review of tests), Obtaining and/or reviewing separately obtained history, Documenting clinical information in the electronic or other health record, Independently interpreting results (not separately reported) and communicating results to the patient/family/caregiver, or Care coordination (not separately reported).         Each patient to whom he or she provides medical services by telemedicine is:  (1) informed of the relationship between the physician and patient and the respective role of any other health care provider with respect to management of the patient; and (2) notified that he or she may decline to receive medical services by telemedicine and may withdraw from such care at any time.

## 2023-06-15 NOTE — ASSESSMENT & PLAN NOTE
Transwoman: gender incongruence   Reviewed therapy, side effects (both wanted and unwanted), possible adverse outcomes, expectations, compliance.      Continue current regimen.  She will send me a note in patient portal if she wants to switch over to injectable estrogen or start progesterone       Visit in 12  months with labs .  Healthy lifestyle stressed  Discussed increased risk of dvt   Avoid prolonged immobility  D/c ert prior to any procedures

## 2023-06-15 NOTE — PATIENT INSTRUCTIONS
Thank you for completing a virtual visit with me!     Per our conversation, I will see you back in 1 year.  Please send me a note in patient portal if you are interested in switching over to injectable estrogen or adding progesterone prior to your visit        Please let me know if you have any other questions.    Thank you,  Elodia Molina MD

## 2023-09-01 RX ORDER — SPIRONOLACTONE 50 MG/1
50 TABLET, FILM COATED ORAL 2 TIMES DAILY
Qty: 180 TABLET | Refills: 3 | Status: SHIPPED | OUTPATIENT
Start: 2023-09-01

## 2023-11-30 RX ORDER — ESTRADIOL 2 MG/1
2 TABLET ORAL 2 TIMES DAILY
Qty: 180 TABLET | Refills: 3 | Status: SHIPPED | OUTPATIENT
Start: 2023-11-30 | End: 2024-03-01

## 2024-03-01 RX ORDER — ESTRADIOL 2 MG/1
TABLET ORAL
Qty: 270 TABLET | Refills: 0 | Status: SHIPPED | OUTPATIENT
Start: 2024-03-01 | End: 2024-06-13 | Stop reason: SDUPTHER

## 2024-04-02 DIAGNOSIS — Z79.899 TRANSGENDER WOMAN ON HORMONE THERAPY: ICD-10-CM

## 2024-04-02 DIAGNOSIS — F64.0 TRANSGENDER WOMAN ON HORMONE THERAPY: ICD-10-CM

## 2024-04-02 RX ORDER — FINASTERIDE 5 MG/1
TABLET, FILM COATED ORAL
Qty: 90 TABLET | Refills: 0 | Status: SHIPPED | OUTPATIENT
Start: 2024-04-02 | End: 2024-06-13 | Stop reason: SDUPTHER

## 2024-05-30 ENCOUNTER — PATIENT MESSAGE (OUTPATIENT)
Dept: ENDOCRINOLOGY | Facility: CLINIC | Age: 29
End: 2024-05-30
Payer: COMMERCIAL

## 2024-06-07 ENCOUNTER — LAB VISIT (OUTPATIENT)
Dept: LAB | Facility: HOSPITAL | Age: 29
End: 2024-06-07
Attending: INTERNAL MEDICINE
Payer: COMMERCIAL

## 2024-06-07 DIAGNOSIS — Z79.899 TRANSGENDER WOMAN ON HORMONE THERAPY: ICD-10-CM

## 2024-06-07 DIAGNOSIS — F64.0 TRANSGENDER WOMAN ON HORMONE THERAPY: ICD-10-CM

## 2024-06-07 LAB
ALBUMIN SERPL BCP-MCNC: 4.2 G/DL (ref 3.5–5.2)
ALP SERPL-CCNC: 51 U/L (ref 55–135)
ALT SERPL W/O P-5'-P-CCNC: 10 U/L (ref 10–44)
ANION GAP SERPL CALC-SCNC: 9 MMOL/L (ref 8–16)
AST SERPL-CCNC: 15 U/L (ref 10–40)
BILIRUB SERPL-MCNC: 0.4 MG/DL (ref 0.1–1)
BUN SERPL-MCNC: 7 MG/DL (ref 6–20)
CALCIUM SERPL-MCNC: 10.2 MG/DL (ref 8.7–10.5)
CHLORIDE SERPL-SCNC: 102 MMOL/L (ref 95–110)
CO2 SERPL-SCNC: 27 MMOL/L (ref 23–29)
CREAT SERPL-MCNC: 0.8 MG/DL (ref 0.5–1.4)
EST. GFR  (NO RACE VARIABLE): >60 ML/MIN/1.73 M^2
ESTRADIOL SERPL-MCNC: 144 PG/ML (ref 11–44)
GLUCOSE SERPL-MCNC: 81 MG/DL (ref 70–110)
POTASSIUM SERPL-SCNC: 3.7 MMOL/L (ref 3.5–5.1)
PROT SERPL-MCNC: 7.3 G/DL (ref 6–8.4)
SODIUM SERPL-SCNC: 138 MMOL/L (ref 136–145)
TESTOST SERPL-MCNC: 21 NG/DL (ref 304–1227)

## 2024-06-07 PROCEDURE — 84403 ASSAY OF TOTAL TESTOSTERONE: CPT | Performed by: INTERNAL MEDICINE

## 2024-06-07 PROCEDURE — 36415 COLL VENOUS BLD VENIPUNCTURE: CPT | Performed by: INTERNAL MEDICINE

## 2024-06-07 PROCEDURE — 82670 ASSAY OF TOTAL ESTRADIOL: CPT | Performed by: INTERNAL MEDICINE

## 2024-06-07 PROCEDURE — 80053 COMPREHEN METABOLIC PANEL: CPT | Performed by: INTERNAL MEDICINE

## 2024-06-13 ENCOUNTER — OFFICE VISIT (OUTPATIENT)
Dept: ENDOCRINOLOGY | Facility: CLINIC | Age: 29
End: 2024-06-13
Attending: INTERNAL MEDICINE
Payer: COMMERCIAL

## 2024-06-13 DIAGNOSIS — F64.0 TRANSGENDER WOMAN ON HORMONE THERAPY: ICD-10-CM

## 2024-06-13 DIAGNOSIS — Z79.899 TRANSGENDER WOMAN ON HORMONE THERAPY: ICD-10-CM

## 2024-06-13 PROCEDURE — G2211 COMPLEX E/M VISIT ADD ON: HCPCS | Mod: 95,,, | Performed by: INTERNAL MEDICINE

## 2024-06-13 PROCEDURE — 1160F RVW MEDS BY RX/DR IN RCRD: CPT | Mod: CPTII,95,, | Performed by: INTERNAL MEDICINE

## 2024-06-13 PROCEDURE — 99214 OFFICE O/P EST MOD 30 MIN: CPT | Mod: 95,,, | Performed by: INTERNAL MEDICINE

## 2024-06-13 PROCEDURE — 1159F MED LIST DOCD IN RCRD: CPT | Mod: CPTII,95,, | Performed by: INTERNAL MEDICINE

## 2024-06-13 RX ORDER — SPIRONOLACTONE 50 MG/1
50 TABLET, FILM COATED ORAL 2 TIMES DAILY
Qty: 180 TABLET | Refills: 3 | Status: SHIPPED | OUTPATIENT
Start: 2024-06-13

## 2024-06-13 RX ORDER — PROGESTERONE 100 MG/1
100 CAPSULE ORAL NIGHTLY
Qty: 90 CAPSULE | Refills: 3 | Status: SHIPPED | OUTPATIENT
Start: 2024-06-13 | End: 2025-06-13

## 2024-06-13 RX ORDER — FINASTERIDE 5 MG/1
5 TABLET, FILM COATED ORAL DAILY
Qty: 90 TABLET | Refills: 3 | Status: SHIPPED | OUTPATIENT
Start: 2024-06-13

## 2024-06-13 RX ORDER — ESTRADIOL 2 MG/1
4 TABLET ORAL 2 TIMES DAILY
Qty: 360 TABLET | Refills: 3 | Status: SHIPPED | OUTPATIENT
Start: 2024-06-13

## 2024-06-13 NOTE — PATIENT INSTRUCTIONS
Thank you for completing a virtual visit with me!     Per our conversation, I will put in a referral to Dr Branham in urology  for discussion regarding an orchiectomy. Will start progesterone 100 mg at bedtime.     We will plan a visit in six months.     If you do change your name legally, please send me a picture of the court order so I can updated in our system.    Please let me know if you have any other questions.    Thank you,  Elodia Molina MD

## 2024-06-13 NOTE — ASSESSMENT & PLAN NOTE
Transwoman: gender incongruence   Reviewed therapy, side effects (both wanted and unwanted), possible adverse outcomes, expectations, compliance.      Continue current regimen. Add progesterone    Refer to dr schwarz     Visit in 6   months with labs .  Healthy lifestyle stressed  Discussed increased risk of dvt   Avoid prolonged immobility  D/c ert prior to any procedures

## 2024-06-13 NOTE — PROGRESS NOTES
Subjective:      Patient ID: Daniel Delgado is a 29 y.o. adult.    Chief Complaint:  Gender     History of Present Illness  With regards to her gender incongruence care:    Gender identity - female  Pronouns she/her     She is now looking into changing her name, then she would like a letter of support to change her gender marker       Therapist at the time hormones were started:  Travon Oneil  (see media tab)        Gender Surgeries - none, but interested grs and ffs   Would like an orchiectomy      Fertility concerns - not interested      started cross hormone therapy 3/2022  Estradiol 4  mg po bid  --sl  - was interested in injections but decided to hold off   aldactone 50  mg bid  finasteride 5 mg qd for scalp hair loss prevention   Wants to start progesterone  Seeing positive changes     +breast development   No regret   Is excited she is doing this   Did  voice training      Social:  Work -  Game development   Family -   Aware   generally supportive   Relationship- single   Interested in women     Housing - lives alone       was dx with social anxiety in the past       New concerns today:     none        ROS:   As above    Objective:     There were no vitals taken for this visit.  BP Readings from Last 3 Encounters:   No data found for BP     Wt Readings from Last 1 Encounters:   No data found for Wt     There is no height or weight on file to calculate BMI.      Physical Exam  Constitutional:       Appearance: Normal appearance.   Psychiatric:         Mood and Affect: Mood normal.         Behavior: Behavior normal.         Thought Content: Thought content normal.         Judgment: Judgment normal.                Lab Reviewed  Assessment and Plan     Transgender woman on hormone therapy  Transwoman: gender incongruence   Reviewed therapy, side effects (both wanted and unwanted), possible adverse outcomes, expectations, compliance.      Continue current regimen. Add progesterone    Refer to dr schwarz     Visit in 6    months with labs .  Healthy lifestyle stressed  Discussed increased risk of dvt   Avoid prolonged immobility  D/c ert prior to any procedures                 The patient location is: [place]  The chief complaint leading to consultation is: above     Visit type: audiovisual    Level of service is based on medical decision-making and not time      Each patient to whom he or she provides medical services by telemedicine is:  (1) informed of the relationship between the physician and patient and the respective role of any other health care provider with respect to management of the patient; and (2) notified that he or she may decline to receive medical services by telemedicine and may withdraw from such care at any time.

## 2024-06-24 ENCOUNTER — PATIENT MESSAGE (OUTPATIENT)
Dept: ENDOCRINOLOGY | Facility: CLINIC | Age: 29
End: 2024-06-24
Payer: COMMERCIAL

## 2024-06-24 DIAGNOSIS — Z79.899 TRANSGENDER WOMAN ON HORMONE THERAPY: Primary | ICD-10-CM

## 2024-06-24 DIAGNOSIS — F64.0 TRANSGENDER WOMAN ON HORMONE THERAPY: Primary | ICD-10-CM

## 2024-07-04 DIAGNOSIS — F64.0 TRANSGENDER WOMAN ON HORMONE THERAPY: Primary | ICD-10-CM

## 2024-07-04 DIAGNOSIS — Z79.899 TRANSGENDER WOMAN ON HORMONE THERAPY: Primary | ICD-10-CM

## 2024-07-08 RX ORDER — SPIRONOLACTONE 50 MG/1
50 TABLET, FILM COATED ORAL 2 TIMES DAILY
Qty: 180 TABLET | Refills: 3 | Status: SHIPPED | OUTPATIENT
Start: 2024-07-08

## 2024-07-24 ENCOUNTER — TELEPHONE (OUTPATIENT)
Dept: UROLOGY | Facility: CLINIC | Age: 29
End: 2024-07-24
Payer: COMMERCIAL

## 2024-07-25 NOTE — PROGRESS NOTES
CC: desired orchiectomy    Daniel Delgado is a 29 y.o. man who is here for the evaluation of bilateral orchiectomy for transgender male to female.    A new pt referred by his PCP, Graham Glez MD     She is referred by Dr. Molina, her endocrinologist for bilateral orchiectomy.  Per her notes:  With regards to her gender incongruence care:     Gender identity - female  Pronouns she/her      She is now looking into changing her name, then she would like a letter of support to change her gender marker         Therapist at the time hormones were started:  Travon Oneil  (see media tab)         Gender Surgeries - none, but interested grs and ffs   Would like an orchiectomy      Fertility concerns - not interested       started cross hormone therapy 3/2022  Estradiol 4  mg po bid  --sl  - was interested in injections but decided to hold off   aldactone 50  mg bid  finasteride 5 mg qd for scalp hair loss prevention   Wants to start progesterone  Seeing positive changes      +breast development   No regret   Is excited she is doing this   Did  voice training      Social:  Work -  Game development, 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  Past Surgical History:   Procedure Laterality Date    WISDOM TOOTH EXTRACTION       Social History     Tobacco Use    Smoking status: Never    Smokeless tobacco: Never   Substance Use Topics    Alcohol use: Not Currently    Drug use: Not Currently     Family History   Problem Relation Name Age of Onset    Thyroid disease Mother       Allergy:  Review of patient's allergies indicates:  No Known Allergies  Outpatient Encounter Medications as of 7/26/2024   Medication Sig Dispense Refill    estradioL (ESTRACE) 2 MG tablet Take 2 tablets (4 mg total) by mouth 2 (two) times a day. 360 tablet 3    finasteride  "(PROSCAR) 5 mg tablet Take 1 tablet (5 mg total) by mouth once daily. 90 tablet 3    progesterone (PROMETRIUM) 100 MG capsule Take 1 capsule (100 mg total) by mouth every evening. 90 capsule 3    spironolactone (ALDACTONE) 50 MG tablet TAKE 1 TABLET BY MOUTH TWICE A  tablet 3     No facility-administered encounter medications on file as of 7/26/2024.     Review of Systems   ROS  Physical Exam     Vitals:    07/26/24 0852   BP: (!) 148/91   Pulse: 103     Physical Exam  Genitalia:  Scrotum: no rash or lesion  Normal symmetric epididymis without masses  Normal vas palpated  Normal size, symmetric testicles with no masses   Normal urethral meatus with no discharge  Normal circumcised penis with no lesion         LABS:  No results found for: "PSA", "PSADIAG", "PSATOTAL", "PSAFREE", "PSAFREEPCT"  No results found for this or any previous visit.  Lab Results   Component Value Date    CREATININE 0.8 06/07/2024    CREATININE 1.1 06/08/2023    CREATININE 0.9 12/14/2022     Results for orders placed or performed in visit on 06/07/24   Testosterone   Result Value Ref Range    Testosterone, Total 21 (L) 304 - 1227 ng/dL   Results for orders placed or performed in visit on 06/08/23   Testosterone   Result Value Ref Range    Testosterone, Total 28 (L) 304 - 1227 ng/dL   Results for orders placed or performed in visit on 03/06/23   Testosterone   Result Value Ref Range    Testosterone, Total 25 (L) 304 - 1227 ng/dL     No results found for: "LABURIN"  No results found for: "HGBA1C"    Radiology:    Assessment and Plan:  Diagnoses and all orders for this visit:    Transgender woman on hormone therapy  -     Ambulatory referral/consult to Urology    Pt is interested in orchiectomy.  She may want additional surgery such as vaginal reconstruction in the future.  The patient is scheduled for an orchiectomy.  The risks and benefits were explained to the patient in detail.  The risks include but are not limited to bleeding, " infection, pain, loss of hormone (testosterone) resulting in erection problems, decreased energy, loss of fertility, scrotal infection with possible abscess formation, no guarantee of cancer cure (if found), hematoma and the need for further procedures.  Patient understands these risks and has agreed to proceed with surgery.     Follow-up:  Follow up in about 1 month (around 8/26/2024), or bilateral orchiectomy.

## 2024-07-26 ENCOUNTER — OFFICE VISIT (OUTPATIENT)
Dept: UROLOGY | Facility: CLINIC | Age: 29
End: 2024-07-26
Payer: COMMERCIAL

## 2024-07-26 ENCOUNTER — TELEPHONE (OUTPATIENT)
Dept: UROLOGY | Facility: CLINIC | Age: 29
End: 2024-07-26

## 2024-07-26 VITALS
HEIGHT: 74 IN | WEIGHT: 167.31 LBS | BODY MASS INDEX: 21.47 KG/M2 | DIASTOLIC BLOOD PRESSURE: 91 MMHG | HEART RATE: 103 BPM | SYSTOLIC BLOOD PRESSURE: 148 MMHG

## 2024-07-26 DIAGNOSIS — F64.0 TRANSGENDER WOMAN ON HORMONE THERAPY: Primary | ICD-10-CM

## 2024-07-26 DIAGNOSIS — Z79.899 TRANSGENDER WOMAN ON HORMONE THERAPY: Primary | ICD-10-CM

## 2024-07-26 DIAGNOSIS — Z78.9 MALE-TO-FEMALE TRANSGENDER PERSON: Primary | ICD-10-CM

## 2024-07-26 PROCEDURE — 99999 PR PBB SHADOW E&M-EST. PATIENT-LVL IV: CPT | Mod: PBBFAC,,, | Performed by: UROLOGY

## 2024-07-26 NOTE — PATIENT INSTRUCTIONS
Feliciano ([place]) Sudarshan, my surgery scheduler will schedule your surgery ([phone]).  The risks and benefits of the procedure were discussed with the patient in detail.    The patient was told to stop all blood thinners prior to surgery.  Antibiotic soap shower a night before surgery      The patient is scheduled for an orchiectomy.  The risks and benefits were explained to the patient in detail.  The risks include but are not limited to bleeding, infection, pain, loss of hormone (testosterone) resulting in erection problems, decreased energy, loss of fertility, scrotal infection with possible abscess formation, no guarantee of cancer cure (if found), hematoma and the need for further procedures.  Patient understands these risks and has agreed to proceed with surgery.

## 2024-07-26 NOTE — TELEPHONE ENCOUNTER
Spoke to the pt while in clinic to offer surgery date of 8/26 at the [place] location, pt accepted. Went over pre-op instructions while in clinic and informed the pt someone from the [place] location will call the day before surgery with arrival time and pre-op instructions. Pt verbalized understanding.

## 2024-08-23 NOTE — PRE-PROCEDURE INSTRUCTIONS
Patient was informed of pre-procedure instructions and arrival time. Pt verbalized understanding and is to be accompanied by mother.    Patient denies taking any over-the-counter vitamins, supplements, nsaids or aspirin products for 7 days.    Dear Janes ,     You are scheduled for a procedure with Dr. Branham on 8/26/2024. Your scheduled arrival time is 5:15 am.  This arrival time is roughly 2 hours before your anticipated procedure time to allow sufficient time for pre-op.  Please wear comfortable clothing  This procedure will take place at the [facility] at the corner of [facility].  It is in the [facility] [place] next to [facility]. The address is:     [address] [place] [address].  [place] [id number]     After entering the building, proceed to the second floor and check in with registration.      Your fasting instructions are as follows:     Nothing to eat after 11:00 pm the evening before your surgery.   You may drink clear liquids up until 2 hours prior to your arrival time.      You MUST have a responsible adult to bring you home.     The evening before and morning of your procedure please hold (do not take) the following medications:  -Aspirin and Aspirin-containing products (Goody's powder, Excedrin)  -NSAIDs (Advil, Ibuprofen, Aleve, Diclofenac)  -Vitamins/Supplements   -Herbal remedies/Teas  -Stimulants (Adderall, Vyvanse, Adipex)  -Diabetic medication (Please bring with you day of procedure)  -Prescription creams/gels/lotions  -spironolactone (ALDACTONE) 50 MG tablet         *May take Tylenol if needed         The evening before and morning of your procedure, take a shower using antibacterial soap (ex: Hibiclens or Dial antibacterial soap). DO NOT apply deodorant, lotion, cologne, or anything else to the skin. Do not wear jewelry or bring any valuables with you.  .     Please do not wear contact lenses the day of your procedure.   Bring any inhalers that you may  need.     If you have any procedure-specific questions, please call your surgeon's office. Any other questions, don't hesitate to call at [phone]     Thanks,  Pre-Admit Testing  Anesthesia Dept OC

## 2024-08-26 ENCOUNTER — ANESTHESIA EVENT (OUTPATIENT)
Dept: SURGERY | Facility: HOSPITAL | Age: 29
End: 2024-08-26
Payer: COMMERCIAL

## 2024-08-26 ENCOUNTER — HOSPITAL ENCOUNTER (OUTPATIENT)
Facility: HOSPITAL | Age: 29
Discharge: HOME | End: 2024-08-26
Attending: UROLOGY | Admitting: UROLOGY
Payer: COMMERCIAL

## 2024-08-26 ENCOUNTER — ANESTHESIA (OUTPATIENT)
Dept: SURGERY | Facility: HOSPITAL | Age: 29
End: 2024-08-26
Payer: COMMERCIAL

## 2024-08-26 VITALS
OXYGEN SATURATION: 98 % | TEMPERATURE: 97 F | DIASTOLIC BLOOD PRESSURE: 59 MMHG | BODY MASS INDEX: 21.17 KG/M2 | SYSTOLIC BLOOD PRESSURE: 107 MMHG | WEIGHT: 165 LBS | HEART RATE: 95 BPM | HEIGHT: 74 IN | RESPIRATION RATE: 16 BRPM

## 2024-08-26 DIAGNOSIS — F64.9 GENDER DYSPHORIA: ICD-10-CM

## 2024-08-26 DIAGNOSIS — Z79.899 TRANSGENDER WOMAN ON HORMONE THERAPY: Primary | ICD-10-CM

## 2024-08-26 DIAGNOSIS — F64.0 TRANSGENDER WOMAN ON HORMONE THERAPY: Primary | ICD-10-CM

## 2024-08-26 PROCEDURE — 37000009 HC ANESTHESIA EA ADD 15 MINS: Performed by: UROLOGY

## 2024-08-26 PROCEDURE — 94761 N-INVAS EAR/PLS OXIMETRY MLT: CPT

## 2024-08-26 PROCEDURE — 37000008 HC ANESTHESIA 1ST 15 MINUTES: Performed by: UROLOGY

## 2024-08-26 PROCEDURE — 25000003 PHARM REV CODE 250: Performed by: UROLOGY

## 2024-08-26 PROCEDURE — 71000015 HC POSTOP RECOV 1ST HR: Performed by: UROLOGY

## 2024-08-26 PROCEDURE — 36000706: Performed by: UROLOGY

## 2024-08-26 PROCEDURE — 63600175 PHARM REV CODE 636 W HCPCS: Performed by: ANESTHESIOLOGY

## 2024-08-26 PROCEDURE — 25000003 PHARM REV CODE 250: Performed by: ANESTHESIOLOGY

## 2024-08-26 PROCEDURE — 99900035 HC TECH TIME PER 15 MIN (STAT)

## 2024-08-26 PROCEDURE — 88302 TISSUE EXAM BY PATHOLOGIST: CPT | Mod: 59 | Performed by: STUDENT IN AN ORGANIZED HEALTH CARE EDUCATION/TRAINING PROGRAM

## 2024-08-26 PROCEDURE — 36000707: Performed by: UROLOGY

## 2024-08-26 PROCEDURE — 71000033 HC RECOVERY, INTIAL HOUR: Performed by: UROLOGY

## 2024-08-26 PROCEDURE — 63600175 PHARM REV CODE 636 W HCPCS: Mod: JZ,JG | Performed by: UROLOGY

## 2024-08-26 PROCEDURE — D9220A PRA ANESTHESIA: Mod: ,,, | Performed by: ANESTHESIOLOGY

## 2024-08-26 PROCEDURE — 54520 REMOVAL OF TESTIS: CPT | Mod: 50,,, | Performed by: UROLOGY

## 2024-08-26 RX ORDER — GLUCAGON 1 MG
1 KIT INJECTION
Status: DISCONTINUED | OUTPATIENT
Start: 2024-08-26 | End: 2024-08-26 | Stop reason: HOSPADM

## 2024-08-26 RX ORDER — ONDANSETRON HYDROCHLORIDE 2 MG/ML
4 INJECTION, SOLUTION INTRAVENOUS ONCE AS NEEDED
Status: DISCONTINUED | OUTPATIENT
Start: 2024-08-26 | End: 2024-08-26 | Stop reason: HOSPADM

## 2024-08-26 RX ORDER — CEFAZOLIN SODIUM 1 G/3ML
INJECTION, POWDER, FOR SOLUTION INTRAMUSCULAR; INTRAVENOUS
Status: DISCONTINUED | OUTPATIENT
Start: 2024-08-26 | End: 2024-08-26

## 2024-08-26 RX ORDER — SODIUM CHLORIDE 0.9 % (FLUSH) 0.9 %
3 SYRINGE (ML) INJECTION
Status: DISCONTINUED | OUTPATIENT
Start: 2024-08-26 | End: 2024-08-26 | Stop reason: HOSPADM

## 2024-08-26 RX ORDER — BUPIVACAINE HYDROCHLORIDE 2.5 MG/ML
INJECTION, SOLUTION EPIDURAL; INFILTRATION; INTRACAUDAL
Status: DISCONTINUED | OUTPATIENT
Start: 2024-08-26 | End: 2024-08-26 | Stop reason: HOSPADM

## 2024-08-26 RX ORDER — OXYCODONE HYDROCHLORIDE 5 MG/1
5 TABLET ORAL EVERY 4 HOURS PRN
Qty: 7 TABLET | Refills: 0 | Status: SHIPPED | OUTPATIENT
Start: 2024-08-26

## 2024-08-26 RX ORDER — LIDOCAINE HYDROCHLORIDE 20 MG/ML
INJECTION INTRAVENOUS
Status: DISCONTINUED | OUTPATIENT
Start: 2024-08-26 | End: 2024-08-26

## 2024-08-26 RX ORDER — KETOROLAC TROMETHAMINE 30 MG/ML
INJECTION, SOLUTION INTRAMUSCULAR; INTRAVENOUS
Status: DISCONTINUED | OUTPATIENT
Start: 2024-08-26 | End: 2024-08-26

## 2024-08-26 RX ORDER — MIDAZOLAM HYDROCHLORIDE 1 MG/ML
INJECTION INTRAMUSCULAR; INTRAVENOUS
Status: DISCONTINUED | OUTPATIENT
Start: 2024-08-26 | End: 2024-08-26

## 2024-08-26 RX ORDER — HYDROMORPHONE HYDROCHLORIDE 1 MG/ML
0.2 INJECTION, SOLUTION INTRAMUSCULAR; INTRAVENOUS; SUBCUTANEOUS EVERY 5 MIN PRN
Status: DISCONTINUED | OUTPATIENT
Start: 2024-08-26 | End: 2024-08-26 | Stop reason: HOSPADM

## 2024-08-26 RX ORDER — LIDOCAINE HYDROCHLORIDE 10 MG/ML
INJECTION, SOLUTION EPIDURAL; INFILTRATION; INTRACAUDAL; PERINEURAL
Status: DISCONTINUED | OUTPATIENT
Start: 2024-08-26 | End: 2024-08-26 | Stop reason: HOSPADM

## 2024-08-26 RX ORDER — DEXAMETHASONE SODIUM PHOSPHATE 4 MG/ML
INJECTION, SOLUTION INTRA-ARTICULAR; INTRALESIONAL; INTRAMUSCULAR; INTRAVENOUS; SOFT TISSUE
Status: DISCONTINUED | OUTPATIENT
Start: 2024-08-26 | End: 2024-08-26

## 2024-08-26 RX ORDER — OXYCODONE HYDROCHLORIDE 5 MG/1
5 TABLET ORAL
Status: DISCONTINUED | OUTPATIENT
Start: 2024-08-26 | End: 2024-08-26 | Stop reason: HOSPADM

## 2024-08-26 RX ORDER — ONDANSETRON HYDROCHLORIDE 2 MG/ML
INJECTION, SOLUTION INTRAVENOUS
Status: DISCONTINUED | OUTPATIENT
Start: 2024-08-26 | End: 2024-08-26

## 2024-08-26 RX ORDER — SODIUM CHLORIDE 9 MG/ML
INJECTION, SOLUTION INTRAVENOUS CONTINUOUS
Status: DISCONTINUED | OUTPATIENT
Start: 2024-08-26 | End: 2024-08-26 | Stop reason: HOSPADM

## 2024-08-26 RX ORDER — PROPOFOL 10 MG/ML
VIAL (ML) INTRAVENOUS
Status: DISCONTINUED | OUTPATIENT
Start: 2024-08-26 | End: 2024-08-26

## 2024-08-26 RX ORDER — FENTANYL CITRATE 50 UG/ML
INJECTION, SOLUTION INTRAMUSCULAR; INTRAVENOUS
Status: DISCONTINUED | OUTPATIENT
Start: 2024-08-26 | End: 2024-08-26

## 2024-08-26 RX ORDER — MEPERIDINE HYDROCHLORIDE 50 MG/ML
12.5 INJECTION INTRAMUSCULAR; INTRAVENOUS; SUBCUTANEOUS ONCE AS NEEDED
Status: DISCONTINUED | OUTPATIENT
Start: 2024-08-26 | End: 2024-08-26 | Stop reason: HOSPADM

## 2024-08-26 RX ADMIN — PROPOFOL 100 MG: 10 INJECTION, EMULSION INTRAVENOUS at 07:08

## 2024-08-26 RX ADMIN — SODIUM CHLORIDE: 9 INJECTION, SOLUTION INTRAVENOUS at 05:08

## 2024-08-26 RX ADMIN — LIDOCAINE HYDROCHLORIDE 100 MG: 20 INJECTION INTRAVENOUS at 07:08

## 2024-08-26 RX ADMIN — MIDAZOLAM 2 MG: 1 INJECTION INTRAMUSCULAR; INTRAVENOUS at 06:08

## 2024-08-26 RX ADMIN — CEFAZOLIN 2 G: 330 INJECTION, POWDER, FOR SOLUTION INTRAMUSCULAR; INTRAVENOUS at 07:08

## 2024-08-26 RX ADMIN — DEXAMETHASONE SODIUM PHOSPHATE 8 MG: 4 INJECTION, SOLUTION INTRA-ARTICULAR; INTRALESIONAL; INTRAMUSCULAR; INTRAVENOUS; SOFT TISSUE at 07:08

## 2024-08-26 RX ADMIN — FENTANYL CITRATE 100 MCG: 50 INJECTION, SOLUTION INTRAMUSCULAR; INTRAVENOUS at 07:08

## 2024-08-26 RX ADMIN — SODIUM CHLORIDE: 9 INJECTION, SOLUTION INTRAVENOUS at 07:08

## 2024-08-26 RX ADMIN — KETOROLAC TROMETHAMINE 30 MG: 30 INJECTION, SOLUTION INTRAMUSCULAR; INTRAVENOUS at 07:08

## 2024-08-26 RX ADMIN — ONDANSETRON 4 MG: 2 INJECTION INTRAMUSCULAR; INTRAVENOUS at 07:08

## 2024-08-26 NOTE — OP NOTE
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  Bilateral simple orchiectomy    Specimen(s): Bilateral testicles    Staff Surgeon: Tevin Branham MD     Assistant Surgeon: Susana Ibarra MD     Anesthesia: General LMA anesthesia    Indications: Daniel Delgado is a 29 y.o. transitioning from male to female presenting for bilateral simple orchiectomy. The risks, benefits, and alternatives have been discussed at length and the patient understands the hormonal and fertility implications of orchiectomy. After being given to opportunity to ask questions and have those questions answered to her satisfaction, she has elected to proceed as planned.    Findings:   Bilateral simple orchiectomy performed in standard fashion    Estimated Blood Loss: min    Drains: none    Procedure in detail:  After the risks and benefits of the procedure were explained, informed consent was obtained. The patient was taken to the operating room and placed int he supine position.  SCDs were applied and working.  Anesthesia was administered.  The patient was shaved, prepped and draped in the usual sterile fashion. Timeout was performed and preoperative antibiotics were confirmed.    An approximately 4 cm was marked in the midline along the median raphe. This was incised sharply with a 15 blade scapel. The underlying dartos tissue was dissected with Bovie electrocautery. The left testicle was delivered and dissected down through the dartos and the tunica vaginalis to expose the testicle. The cord was dissected proximally to allow ligation as high as possible. The cord was doubly clamped with a Harika clamp and divided between the clamps. The specimen was passed off the field. A 0 silk stick tie as well as 2-0 silk free tie was used to  ligate the proximal spermatic cord. Hemostasis was excellent. We then turned to the contralateral testicle and performed the exact same procedure. The right testicle was delivered and dissected down through the dartos and the tunica vaginalis to expose the testicle. The cord was dissected proximally to allow ligation as high as possible. The cord was doubly clamped with a Harika clamp and divided between the clamps. The specimen was passed off the field. A 0 silk stick tie as well as a 2-0 silk free tie was used to ligate the proximal spermatic cord. Hemostasis was excellent. 0.25% bupivacaine with 1% lidocaine was injected into bilateral cords.    The dartos was re-approximated with a 2-0 vicryl and the skin was closed with a 4-0 Monocryl in running horizontal mattress fashion. Dermabond was applied to the incision. Scrotal support and mesh underwear were used.    The patient tolerated the procedure well and was transferred to the recovery room in stable condition.      Disposition:  The patient will follow up with Dr. Branham in 4 weeks.

## 2024-08-26 NOTE — ANESTHESIA PREPROCEDURE EVALUATION
08/26/2024  Daniel Delgado is a 29 y.o., adult.      Pre-op Assessment    I have reviewed the Patient Summary Reports.     I have reviewed the Nursing Notes.    I have reviewed the Medications.     Review of Systems  Anesthesia Hx:             Denies Family Hx of Anesthesia complications.    Denies Personal Hx of Anesthesia complications.                    Procedure: ORCHIECTOMY (Bilateral)         Patient Active Problem List   Diagnosis    Transgender woman on hormone therapy       History reviewed. No pertinent past medical history.    ECHO: No results found for this or any previous visit.      Body mass index is 21.18 kg/m².    Tobacco Use: Low Risk  (8/26/2024)    Patient History     Smoking Tobacco Use: Never     Smokeless Tobacco Use: Never     Passive Exposure: Not on file       Social History     Substance and Sexual Activity   Drug Use Not Currently        Alcohol Use: Not At Risk (6/12/2024)    AUDIT-C     Frequency of Alcohol Consumption: Never     Average Number of Drinks: Patient does not drink     Frequency of Binge Drinking: Never       Review of patient's allergies indicates:  No Known Allergies      Airway:  No value filed.     Physical Exam  General: Well nourished    Airway:  Mallampati: II   Mouth Opening: Normal  TM Distance: Normal    Chest/Lungs:  Normal Respiratory Rate        Anesthesia Plan  Type of Anesthesia, risks & benefits discussed:    Anesthesia Type: Gen Supraglottic Airway, Gen ETT  Intra-op Monitoring Plan: Standard ASA Monitors  Post Op Pain Control Plan: multimodal analgesia  Induction:  IV  Informed Consent: Informed consent signed with the Patient and all parties understand the risks and agree with anesthesia plan.  All questions answered.   ASA Score: 2  Day of Surgery Review of History & Physical: H&P Update referred to the surgeon/provider.    Ready For Surgery From  Anesthesia Perspective.     .

## 2024-08-26 NOTE — DISCHARGE SUMMARY
[facility]  Discharge Note  Short Stay    Procedure(s) (LRB):  ORCHIECTOMY (Bilateral)      OUTCOME: Patient tolerated treatment/procedure well without complication and is now ready for discharge.    DISPOSITION: Home or Self Care    FINAL DIAGNOSIS:  gender dysphoria    FOLLOWUP: In clinic    TIME SPENT ON DISCHARGE: 5 minutes

## 2024-08-26 NOTE — ANESTHESIA POSTPROCEDURE EVALUATION
Anesthesia Post Evaluation    Patient: Daniel Delgado    Procedure(s) Performed: Procedure(s) (LRB):  ORCHIECTOMY (Bilateral)    Final Anesthesia Type: general      Patient location during evaluation: PACU  Patient participation: Yes- Able to Participate  Level of consciousness: awake and alert  Post-procedure vital signs: reviewed and stable  Pain management: adequate  Airway patency: patent    PONV status at discharge: No PONV  Anesthetic complications: no      Cardiovascular status: stable  Respiratory status: spontaneous ventilation  Hydration status: euvolemic  Follow-up not needed.              Vitals Value Taken Time   /59 08/26/24 0835   Temp 36.2 °C (97.2 °F) 08/26/24 0811   Pulse 100 08/26/24 0840   Resp 15 08/26/24 0840   SpO2 99 % 08/26/24 0840   Vitals shown include unfiled device data.      Event Time   Out of Recovery 08:26:00         Pain/Yaz Score: Yaz Score: 9 (8/26/2024  8:26 AM)

## 2024-08-26 NOTE — DISCHARGE INSTRUCTIONS
Post Scrotal Surgery Instructions  Do not strain to have a bowel movement  No strenuous exercise x 7 days  No driving while you are on narcotic pain medications     You can expect:  Some swelling in your scrotum but significant swelling or pain should be evaluated by an MD or ER  Swelling should resolve in the next few months    You can shower tomorrow    You can place ice on your scrotum for 30 min to 1 hour at a time for swelling  You can also elevate your scrotum under towels to help with swelling when you are sitting    You can wear a jock strap or tight white underwear to help with swelling    The steri-strips should fall off on their own in 1- 2 weeks.     Call the doctor if:  Temperature is greater than 101F  Persistent vomiting and inability to keep food down  Inability to pee

## 2024-08-26 NOTE — PLAN OF CARE
Patient discharge instructions reviewed, patient verbalized understanding. Tolerated PO fluids well, denies nausea,  IV removed, cath tip intact.  Escorted to family via wheelchair.  No concerns voiced.

## 2024-08-26 NOTE — PLAN OF CARE
Chart reviewed. Pre-op nursing care completed per orders. Safe surgery checklist complete. Family at bedside, pt belongings placed in PACU locker 9. Waiting H&P, anesthesia and procedural consent prior to procedure. Call button within reach.

## 2024-08-26 NOTE — H&P
"[facility]   H&P  SUBJECTIVE:     Chief Complaint: gender dysphoria    History of Present Illness:  Daniel Delgado is a 29 y.o. adult who presents today for bilateral simple orchiectomy.          OBJECTIVE:     Vital Signs (Most Recent)  Temp: 99 °F (37.2 °C) (08/26/24 0544)  Pulse: 105 (08/26/24 0544)  Resp: 16 (08/26/24 0544)  BP: 135/80 (08/26/24 0544)  SpO2: 100 % (08/26/24 0628)    Estimated body mass index is 21.18 kg/m² as calculated from the following:    Height as of this encounter: 6' 2" (1.88 m).    Weight as of this encounter: 74.8 kg (165 lb).    General: No acute distress, well developed. AAOx3  Head: Normocephalic, Atraumatic  CV: regular rate  Lungs: normal respiratory effort, no respiratory distress    Lab Results   Component Value Date    BUN 7 06/07/2024    CREATININE 0.8 06/07/2024    AST 15 06/07/2024    ALT 10 06/07/2024    ALKPHOS 51 (L) 06/07/2024    ALBUMIN 4.2 06/07/2024        ASSESSMENT     1. Gender dysphoria      PLAN:     1. To OR for bilateral orchiectomy   2. Consent signed, all questions answered    Susana Ibarra MD    "

## 2024-08-26 NOTE — TRANSFER OF CARE
"Anesthesia Transfer of Care Note    Patient: Daniel Delgado    Procedure(s) Performed: Procedure(s) (LRB):  ORCHIECTOMY (Bilateral)    Patient location: PACU    Anesthesia Type: general    Transport from OR: Transported from OR on 6-10 L/min O2 by face mask with adequate spontaneous ventilation    Post pain: adequate analgesia    Post assessment: no apparent anesthetic complications    Post vital signs: stable    Level of consciousness: awake    Nausea/Vomiting: no nausea/vomiting    Complications: none    Transfer of care protocol was followed      Last vitals: Visit Vitals  /80 (BP Location: Right arm, Patient Position: Lying)   Pulse 105   Temp 37.2 °C (99 °F) (Temporal)   Resp 16   Ht 6' 2" (1.88 m)   Wt 74.8 kg (165 lb)   SpO2 100%   BMI 21.18 kg/m²     "

## 2024-08-28 LAB
FINAL PATHOLOGIC DIAGNOSIS: NORMAL
GROSS: NORMAL
Lab: NORMAL

## 2024-09-24 ENCOUNTER — OFFICE VISIT (OUTPATIENT)
Dept: UROLOGY | Facility: CLINIC | Age: 29
End: 2024-09-24
Payer: COMMERCIAL

## 2024-09-24 VITALS
BODY MASS INDEX: 20.93 KG/M2 | WEIGHT: 163.13 LBS | DIASTOLIC BLOOD PRESSURE: 86 MMHG | HEART RATE: 101 BPM | HEIGHT: 74 IN | SYSTOLIC BLOOD PRESSURE: 145 MMHG

## 2024-09-24 DIAGNOSIS — Z90.79 S/P ORCHIECTOMY: Primary | ICD-10-CM

## 2024-09-24 PROCEDURE — 99999 PR PBB SHADOW E&M-EST. PATIENT-LVL III: CPT | Mod: PBBFAC,,, | Performed by: UROLOGY

## 2024-09-24 PROCEDURE — 99499 UNLISTED E&M SERVICE: CPT | Mod: S$GLB,,, | Performed by: UROLOGY

## 2024-09-24 NOTE — PROGRESS NOTES
CC: s/p bilateral orchiectomy post op    Daniel Delgado is a 29 y.o. man who is here for the evaluation of bilateral orchiectomy for transgender male to female.  Daniel Delgado is a 29 y.o. transitioning from male to female presenting for bilateral simple orchiectomy. The risks, benefits, and alternatives have been discussed at length and the patient understands the hormonal and fertility implications of orchiectomy. After being given to opportunity to ask questions and have those questions answered to her satisfaction, she has elected to proceed as planned.     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  Bilateral simple orchiectomy   Specimen(s): Bilateral testicles   Pathology:  Testicle, right and left, bilateral orchiectomy:       - Testicle with atrophy    Findings:   Bilateral simple orchiectomy performed in standard fashion    Following the surgery, she did well.  She is very pleased.    Previous Notes:  His PCP, Graham Glez MD     She is referred by Dr. Molina, her endocrinologist for bilateral orchiectomy.  Per her notes:  With regards to her gender incongruence care:     Gender identity - female  Pronouns she/her      She is now looking into changing her name, then she would like a letter of support to change her gender marker         Therapist at the time hormones were started:  Travon Oneil  (see media tab)         Gender Surgeries - none, but interested grs and ffs   Would like an orchiectomy      Fertility concerns - not interested       started cross hormone therapy 3/2022  Estradiol 4  mg po bid  --sl  - was interested in injections but decided to hold off   aldactone 50  mg bid  finasteride 5 mg qd for scalp hair loss prevention   Wants to start progesterone  Seeing positive changes      +breast development   No regret   Is  "excited she is doing this   Did  voice training      Social:  Work -  Game development, 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  Past Surgical History:   Procedure Laterality Date    ORCHIECTOMY Bilateral 8/26/2024    Procedure: ORCHIECTOMY;  Surgeon: Tevin Branham MD;  Location: [facility];  Service: Urology;  Laterality: Bilateral;  1 hr    WISDOM TOOTH EXTRACTION       Social History     Tobacco Use    Smoking status: Never    Smokeless tobacco: Never   Substance Use Topics    Alcohol use: Not Currently    Drug use: Not Currently     Family History   Problem Relation Name Age of Onset    Thyroid disease Mother       Allergy:  Review of patient's allergies indicates:  No Known Allergies  Outpatient Encounter Medications as of 9/24/2024   Medication Sig Dispense Refill    estradioL (ESTRACE) 2 MG tablet Take 2 tablets (4 mg total) by mouth 2 (two) times a day. 360 tablet 3    finasteride (PROSCAR) 5 mg tablet Take 1 tablet (5 mg total) by mouth once daily. 90 tablet 3    oxyCODONE (ROXICODONE) 5 MG immediate release tablet Take 1 tablet (5 mg total) by mouth every 4 (four) hours as needed for Pain. 7 tablet 0    progesterone (PROMETRIUM) 100 MG capsule Take 1 capsule (100 mg total) by mouth every evening. 90 capsule 3    spironolactone (ALDACTONE) 50 MG tablet TAKE 1 TABLET BY MOUTH TWICE A  tablet 3     No facility-administered encounter medications on file as of 9/24/2024.     Review of Systems   ROS  Physical Exam     Vitals:    09/24/24 1109   BP: (!) 145/86   Pulse: 101       Physical Exam  Genitalia:  Scrotum: no rash or lesion, well healed incision after bilateral orchiectomy.           LABS:  No results found for: "PSA", "PSADIAG", "PSATOTAL", "PSAFREE", "PSAFREEPCT"  No results found for this or any previous visit.  Lab " "Results   Component Value Date    CREATININE 0.8 06/07/2024    CREATININE 1.1 06/08/2023    CREATININE 0.9 12/14/2022     Results for orders placed or performed in visit on 06/07/24   Testosterone   Result Value Ref Range    Testosterone, Total 21 (L) 304 - 1227 ng/dL   Results for orders placed or performed in visit on 06/08/23   Testosterone   Result Value Ref Range    Testosterone, Total 28 (L) 304 - 1227 ng/dL   Results for orders placed or performed in visit on 03/06/23   Testosterone   Result Value Ref Range    Testosterone, Total 25 (L) 304 - 1227 ng/dL     No results found for: "LABURIN"  No results found for: "HGBA1C"    Radiology:    Assessment and Plan:  Daniel Smyth" was seen today for post-op evaluation.    Diagnoses and all orders for this visit:    S/P orchiectomy    Doing well.  She can follow up with her endocrinologist for any hormone adjustment after bilateral orchiectomy.    Follow-up:  Follow up if symptoms worsen or fail to improve.    "

## 2024-10-08 ENCOUNTER — PATIENT MESSAGE (OUTPATIENT)
Dept: ENDOCRINOLOGY | Facility: CLINIC | Age: 29
End: 2024-10-08
Payer: COMMERCIAL

## 2024-10-16 ENCOUNTER — OFFICE VISIT (OUTPATIENT)
Dept: ENDOCRINOLOGY | Facility: CLINIC | Age: 29
End: 2024-10-16
Payer: COMMERCIAL

## 2024-10-16 DIAGNOSIS — F64.0 TRANSGENDER WOMAN ON HORMONE THERAPY: Primary | ICD-10-CM

## 2024-10-16 DIAGNOSIS — Z79.899 TRANSGENDER WOMAN ON HORMONE THERAPY: Primary | ICD-10-CM

## 2024-10-16 PROCEDURE — 99213 OFFICE O/P EST LOW 20 MIN: CPT | Mod: S$GLB,,, | Performed by: INTERNAL MEDICINE

## 2024-10-16 PROCEDURE — 99999 PR PBB SHADOW E&M-EST. PATIENT-LVL II: CPT | Mod: PBBFAC,,, | Performed by: INTERNAL MEDICINE

## 2024-10-16 PROCEDURE — 1160F RVW MEDS BY RX/DR IN RCRD: CPT | Mod: CPTII,S$GLB,, | Performed by: INTERNAL MEDICINE

## 2024-10-16 PROCEDURE — 1159F MED LIST DOCD IN RCRD: CPT | Mod: CPTII,S$GLB,, | Performed by: INTERNAL MEDICINE

## 2024-10-16 PROCEDURE — G2211 COMPLEX E/M VISIT ADD ON: HCPCS | Mod: S$GLB,,, | Performed by: INTERNAL MEDICINE

## 2024-10-16 NOTE — PROGRESS NOTES
Subjective:      Patient ID: Daniel Delgado is a 29 y.o. adult.    Chief Complaint:  Gender     History of Present Illness  With regards to her gender incongruence care:    Gender identity - female  Pronouns she/her     She is now looking into changing her name, then she would like a letter of support to change her gender marker        Therapist at the time hormones were started:  Travon Oneil  (see media tab)        Gender Surgeries -  orchiectomy --> 8/26/24  May look into FFA and zero depth vaginoplasty      Fertility concerns - not interested      started cross hormone therapy 3/2022  Estradiol 4  mg po bid  --sl  - was interested in injections but decided to hold off    Progesterone -   She would like to continue  Seeing positive changes       Off aldactone  and finasteride    +breast development   No regret   Is excited she is doing this   Did  voice training      Social:  Work -  Game development   Family -   Aware   generally supportive   Relationship- single   Interested in women     Housing - lives alone       was dx with social anxiety in the past       New concerns today:     none        ROS:   As above    Objective:     There were no vitals taken for this visit.  BP Readings from Last 3 Encounters:   09/24/24 (!) 145/86   08/26/24 (!) 107/59   07/26/24 (!) 148/91     Wt Readings from Last 1 Encounters:   09/24/24 1109 74 kg (163 lb 2.3 oz)     There is no height or weight on file to calculate BMI.      Physical Exam  Constitutional:       Appearance: Normal appearance.   Psychiatric:         Mood and Affect: Mood normal.         Behavior: Behavior normal.         Thought Content: Thought content normal.         Judgment: Judgment normal.              Lab Reviewed  Assessment and Plan     No problem-specific Assessment & Plan notes found for this encounter.               The patient location is: [place]  The chief complaint leading to consultation is: above     Visit type: audiovisual    Level of service is  based on medical decision-making and not time      Each patient to whom he or she provides medical services by telemedicine is:  (1) informed of the relationship between the physician and patient and the respective role of any other health care provider with respect to management of the patient; and (2) notified that he or she may decline to receive medical services by telemedicine and may withdraw from such care at any time.

## 2024-10-16 NOTE — ASSESSMENT & PLAN NOTE
Transwoman: gender incongruence   Reviewed therapy, side effects (both wanted and unwanted), possible adverse outcomes, expectations, compliance.        Is doing great.  She will send me a copy of the court order if she changes her name     Visit in  12   months with labs .  Healthy lifestyle stressed  Discussed increased risk of dvt   Avoid prolonged immobility  D/c ert prior to any procedures

## 2024-10-16 NOTE — PATIENT INSTRUCTIONS
Thank you for completing a virtual visit with me!     Per our conversation,  please send me a copy of the court order once you change your name.  In addition, let me know if you need a letter of support to change your gender marker.      We will plan on seeing you yearly,  unless you need me prior    Please let me know if you have any other questions.    Thank you,  Elodia Molina MD

## 2024-12-06 DIAGNOSIS — Z79.899 TRANSGENDER WOMAN ON HORMONE THERAPY: Primary | ICD-10-CM

## 2024-12-06 DIAGNOSIS — F64.0 TRANSGENDER WOMAN ON HORMONE THERAPY: Primary | ICD-10-CM

## 2024-12-06 RX ORDER — ESTRADIOL 2 MG/1
2 TABLET ORAL 2 TIMES DAILY
Qty: 180 TABLET | Refills: 3 | Status: SHIPPED | OUTPATIENT
Start: 2024-12-06

## 2025-06-05 DIAGNOSIS — Z79.899 TRANSGENDER WOMAN ON HORMONE THERAPY: ICD-10-CM

## 2025-06-05 DIAGNOSIS — F64.0 TRANSGENDER WOMAN ON HORMONE THERAPY: ICD-10-CM

## 2025-06-05 RX ORDER — ESTRADIOL 2 MG/1
4 TABLET ORAL 2 TIMES DAILY
Qty: 360 TABLET | Refills: 0 | Status: SHIPPED | OUTPATIENT
Start: 2025-06-05

## 2025-06-05 RX ORDER — PROGESTERONE 100 MG/1
100 CAPSULE ORAL NIGHTLY
Qty: 90 CAPSULE | Refills: 0 | Status: SHIPPED | OUTPATIENT
Start: 2025-06-05

## 2025-08-27 ENCOUNTER — OFFICE VISIT (OUTPATIENT)
Dept: URGENT CARE | Facility: CLINIC | Age: 30
End: 2025-08-27
Payer: COMMERCIAL

## 2025-08-27 VITALS
HEIGHT: 74 IN | OXYGEN SATURATION: 99 % | TEMPERATURE: 98 F | HEART RATE: 104 BPM | DIASTOLIC BLOOD PRESSURE: 81 MMHG | WEIGHT: 180.13 LBS | SYSTOLIC BLOOD PRESSURE: 131 MMHG | BODY MASS INDEX: 23.12 KG/M2

## 2025-08-27 DIAGNOSIS — R30.0 BURNING WITH URINATION: ICD-10-CM

## 2025-08-27 DIAGNOSIS — N30.01 ACUTE CYSTITIS WITH HEMATURIA: Primary | ICD-10-CM

## 2025-08-27 LAB
BILIRUBIN, UA POC OHS: NEGATIVE
BLOOD, UA POC OHS: ABNORMAL
CLARITY, UA POC OHS: ABNORMAL
COLOR, UA POC OHS: YELLOW
GLUCOSE, UA POC OHS: NEGATIVE
KETONES, UA POC OHS: NEGATIVE
LEUKOCYTES, UA POC OHS: ABNORMAL
NITRITE, UA POC OHS: NEGATIVE
PH, UA POC OHS: 6
PROTEIN, UA POC OHS: NEGATIVE
SPECIFIC GRAVITY, UA POC OHS: 1.01
UROBILINOGEN, UA POC OHS: 0.2

## 2025-08-27 PROCEDURE — 81003 URINALYSIS AUTO W/O SCOPE: CPT | Mod: QW,S$GLB,, | Performed by: PHYSICIAN ASSISTANT

## 2025-08-27 PROCEDURE — 99213 OFFICE O/P EST LOW 20 MIN: CPT | Mod: S$GLB,,, | Performed by: PHYSICIAN ASSISTANT

## 2025-08-27 RX ORDER — SULFAMETHOXAZOLE AND TRIMETHOPRIM 800; 160 MG/1; MG/1
1 TABLET ORAL 2 TIMES DAILY
Qty: 10 TABLET | Refills: 0 | Status: SHIPPED | OUTPATIENT
Start: 2025-08-27 | End: 2025-09-01

## 2025-08-29 ENCOUNTER — TELEPHONE (OUTPATIENT)
Dept: URGENT CARE | Facility: CLINIC | Age: 30
End: 2025-08-29
Payer: COMMERCIAL

## (undated) DEVICE — SUT CTD VICRYL VIL BR SH 27

## (undated) DEVICE — SUT 0 18IN SILK BLK BRAIDE

## (undated) DEVICE — ADHESIVE MASTISOL VIAL 48/BX

## (undated) DEVICE — SUT K835H SILK 1-0

## (undated) DEVICE — CLIPPER BLADE MOD 4406 (CAREF)

## (undated) DEVICE — TRAY MINOR GEN SURG OMC

## (undated) DEVICE — DRAPE LAP T SHT W/ INSTR PAD

## (undated) DEVICE — SUSPENSORY X-LARGE

## (undated) DEVICE — SUT MCRYL PLUS 4-0 PS2 27IN

## (undated) DEVICE — SUT 2/0 30IN SILK BLK BRAI

## (undated) DEVICE — ELECTRODE REM PLYHSV RETURN 9